# Patient Record
Sex: MALE | Race: WHITE | NOT HISPANIC OR LATINO | Employment: OTHER | URBAN - METROPOLITAN AREA
[De-identification: names, ages, dates, MRNs, and addresses within clinical notes are randomized per-mention and may not be internally consistent; named-entity substitution may affect disease eponyms.]

---

## 2017-03-07 ENCOUNTER — OFFICE VISIT (OUTPATIENT)
Dept: OPTOMETRY | Facility: CLINIC | Age: 72
End: 2017-03-07
Payer: MEDICARE

## 2017-03-07 DIAGNOSIS — H25.13 NUCLEAR SCLEROSIS, BILATERAL: Primary | ICD-10-CM

## 2017-03-07 DIAGNOSIS — H52.4 HYPEROPIA WITH PRESBYOPIA OF BOTH EYES: ICD-10-CM

## 2017-03-07 DIAGNOSIS — H52.03 HYPEROPIA WITH PRESBYOPIA OF BOTH EYES: ICD-10-CM

## 2017-03-07 PROCEDURE — 92015 DETERMINE REFRACTIVE STATE: CPT | Mod: S$GLB,,, | Performed by: OPTOMETRIST

## 2017-03-07 PROCEDURE — 99999 PR PBB SHADOW E&M-EST. PATIENT-LVL II: CPT | Mod: PBBFAC,,, | Performed by: OPTOMETRIST

## 2017-03-07 PROCEDURE — 92004 COMPRE OPH EXAM NEW PT 1/>: CPT | Mod: S$GLB,,, | Performed by: OPTOMETRIST

## 2017-03-08 NOTE — PROGRESS NOTES
HPI     Pt states:increased blurred vision, lost glasses, needs new rx. C/o glare   when driving at night. C/o bump LLL, sore to the touch  PATIENT DENIES FLASHES, FLOATERS, PAIN OR DIPLOPIA   PATIENT'S LAST DFE WAS APPROXIMATELY 6 years ago         Last edited by Farrah Chen, PCT on 3/7/2017  1:50 PM.     ROS     Negative for: Constitutional, Gastrointestinal, Neurological, Skin,   Genitourinary, Musculoskeletal, HENT, Endocrine, Cardiovascular, Eyes,   Respiratory, Psychiatric, Allergic/Imm, Heme/Lymph    Last edited by Paula Christie, OD on 3/7/2017  2:40 PM. (History)        Assessment /Plan     For exam results, see Encounter Report.    Nuclear sclerosis, bilateral    Hyperopia with presbyopia of both eyes            1.  Educated on cataracts and affects on vision.  Early-monitor.  2.  Bifocal rx given.  Eye health normal OU.        RTC 1 year for routine exam.

## 2017-06-27 ENCOUNTER — OFFICE VISIT (OUTPATIENT)
Dept: INTERNAL MEDICINE | Facility: CLINIC | Age: 72
End: 2017-06-27
Attending: FAMILY MEDICINE
Payer: MEDICARE

## 2017-06-27 ENCOUNTER — OFFICE VISIT (OUTPATIENT)
Dept: INTERNAL MEDICINE | Facility: CLINIC | Age: 72
End: 2017-06-27
Payer: MEDICARE

## 2017-06-27 VITALS
DIASTOLIC BLOOD PRESSURE: 76 MMHG | WEIGHT: 149.94 LBS | OXYGEN SATURATION: 92 % | SYSTOLIC BLOOD PRESSURE: 142 MMHG | SYSTOLIC BLOOD PRESSURE: 140 MMHG | WEIGHT: 149.94 LBS | BODY MASS INDEX: 22.79 KG/M2 | OXYGEN SATURATION: 92 % | BODY MASS INDEX: 22.72 KG/M2 | DIASTOLIC BLOOD PRESSURE: 80 MMHG | HEART RATE: 96 BPM | HEIGHT: 68 IN | HEART RATE: 100 BPM | RESPIRATION RATE: 20 BRPM

## 2017-06-27 DIAGNOSIS — R06.01 ORTHOPNEA: ICD-10-CM

## 2017-06-27 DIAGNOSIS — J44.9 CHRONIC OBSTRUCTIVE PULMONARY DISEASE, UNSPECIFIED COPD TYPE: ICD-10-CM

## 2017-06-27 DIAGNOSIS — E78.5 DYSLIPIDEMIA: ICD-10-CM

## 2017-06-27 DIAGNOSIS — F12.90 MARIJUANA SMOKER, EPISODIC: ICD-10-CM

## 2017-06-27 DIAGNOSIS — J44.1 CHRONIC OBSTRUCTIVE PULMONARY DISEASE WITH ACUTE EXACERBATION: Primary | ICD-10-CM

## 2017-06-27 DIAGNOSIS — F10.10 ALCOHOL USE DISORDER, MILD, ABUSE: ICD-10-CM

## 2017-06-27 DIAGNOSIS — F17.200 TOBACCO USE DISORDER: ICD-10-CM

## 2017-06-27 DIAGNOSIS — R03.0 PREHYPERTENSION: ICD-10-CM

## 2017-06-27 DIAGNOSIS — L30.9 DERMATITIS: ICD-10-CM

## 2017-06-27 DIAGNOSIS — Z00.00 ENCOUNTER FOR PREVENTIVE HEALTH EXAMINATION: Primary | ICD-10-CM

## 2017-06-27 DIAGNOSIS — R09.02 HYPOXIA: ICD-10-CM

## 2017-06-27 PROCEDURE — 1126F AMNT PAIN NOTED NONE PRSNT: CPT | Mod: S$GLB,,, | Performed by: FAMILY MEDICINE

## 2017-06-27 PROCEDURE — 1159F MED LIST DOCD IN RCRD: CPT | Mod: S$GLB,,, | Performed by: FAMILY MEDICINE

## 2017-06-27 PROCEDURE — 99999 PR PBB SHADOW E&M-EST. PATIENT-LVL V: CPT | Mod: PBBFAC,,, | Performed by: NURSE PRACTITIONER

## 2017-06-27 PROCEDURE — 99999 PR PBB SHADOW E&M-EST. PATIENT-LVL III: CPT | Mod: PBBFAC,,, | Performed by: FAMILY MEDICINE

## 2017-06-27 PROCEDURE — 99499 UNLISTED E&M SERVICE: CPT | Mod: S$GLB,,, | Performed by: FAMILY MEDICINE

## 2017-06-27 PROCEDURE — 99214 OFFICE O/P EST MOD 30 MIN: CPT | Mod: S$GLB,,, | Performed by: FAMILY MEDICINE

## 2017-06-27 PROCEDURE — G0439 PPPS, SUBSEQ VISIT: HCPCS | Mod: S$GLB,,, | Performed by: NURSE PRACTITIONER

## 2017-06-27 PROCEDURE — 99499 UNLISTED E&M SERVICE: CPT | Mod: S$GLB,,, | Performed by: NURSE PRACTITIONER

## 2017-06-27 RX ORDER — PREDNISONE 20 MG/1
20 TABLET ORAL 2 TIMES DAILY
Qty: 20 TABLET | Refills: 0 | Status: SHIPPED | OUTPATIENT
Start: 2017-06-27 | End: 2017-07-07

## 2017-06-27 RX ORDER — KETOCONAZOLE 20 MG/G
CREAM TOPICAL DAILY
Qty: 60 G | Refills: 0 | Status: SHIPPED | OUTPATIENT
Start: 2017-06-27

## 2017-06-27 RX ORDER — UBIDECARENONE 30 MG
30 CAPSULE ORAL 3 TIMES DAILY
COMMUNITY

## 2017-06-27 RX ORDER — AMOXICILLIN AND CLAVULANATE POTASSIUM 875; 125 MG/1; MG/1
1 TABLET, FILM COATED ORAL 2 TIMES DAILY
Qty: 20 TABLET | Refills: 0 | Status: SHIPPED | OUTPATIENT
Start: 2017-06-27 | End: 2017-07-07

## 2017-06-27 RX ORDER — ALBUTEROL SULFATE 90 UG/1
1-2 AEROSOL, METERED RESPIRATORY (INHALATION) EVERY 6 HOURS PRN
Qty: 18 G | Refills: 1 | Status: SHIPPED | OUTPATIENT
Start: 2017-06-27

## 2017-06-27 NOTE — PATIENT INSTRUCTIONS
Exercise for a Healthier Heart  You may wonder how you can improve the health of your heart. If youre thinking about exercise, youre on the right track. You dont need to become an athlete, but you do need a certain amount of brisk exercise to help strengthen your heart. If you have been diagnosed with a heart condition, your doctor may recommend exercise to help stabilize your condition. To help make exercise a habit, choose safe, fun activities.     Exercise with a friend. When activity is fun, you're more likely to stick with it.     Be sure to check with your healthcare provider before starting an exercise program.   Why exercise?  Exercising regularly offers many healthy rewards. It can help you do all of the following:  · Improve your blood cholesterol level to help prevent further heart trouble  · Lower your blood pressure to help prevent a stroke or heart attack  · Control diabetes, or reduce your risk of getting this disease  · Improve your heart and lung function  · Reach and maintain a healthy weight  · Make your muscles stronger and more limber so you can stay active  · Prevent falls and fractures by slowing the loss of bone mass (osteoporosis)  · Manage stress better  · Reduce your blood pressure  · Improve your sense of self and your body image  Exercise tips  Ease into your routine. Set small goals. Then build on them.  Exercise on most days. Aim for a total of 150 or more minutes of moderate to  vigorous intensity activity each week. Consider 40 minutes, 3 to 4 times a week. For best results, activity should last for 40 minutes on average. It is OK to work up to the 40 minute period over time. Examples of moderate-intensity activity is walking 1 mile in 15 minutes or 30 to 45 minutes of yard work.  Step up your daily activity level. Along with your exercise program, try being more active throughout the day. Walk instead of drive. Do more household tasks or yard work.  Choose one or more  activities you enjoy. Walking is one of the easiest things you can do. You can also try swimming, riding a bike, dancing, or taking an exercise class.  Stop exercising and call your doctor if you:  · Have chest pain or feel dizzy or lightheaded  · Feel burning, tightness, pressure, or heaviness in your chest, neck, shoulders, back, or arms  · Have unusual shortness of breath  · Have increased joint or muscle pain  · Have palpitations or an irregular heartbeat   Date Last Reviewed: 5/1/2016 © 2000-2016 [employer]. [address] [place] [id number]. All rights reserved. This information is not intended as a substitute for professional medical care. Always follow your healthcare professional's instructions.          Counseling and Referral of Other Preventative  (Italic type indicates deductible and co-insurance are waived)    Patient Name: Louis Nickerson  Today's Date: 6/27/2017      SERVICE LIMITATIONS RECOMMENDATION    Vaccines    · Pneumococcal (once after 65)    · Influenza (annually)    · Hepatitis B (if medium/high risk)    · Prevnar 13      Hepatitis B medium/high risk factors:       - End-stage renal disease       - Hemophiliacs who received Factor VII or         IX concentrates       - Clients of institutions for the mentally             retarded       - Persons who live in the same house as          a HepB carrier       - Homosexual men       - Illicit injectable drug abusers     Pneumococcal: Done, no repeat necessary     Influenza: N/A     Hepatitis B: N/A     Prevnar 13: Done, no repeat necessary    Prostate cancer screening (annually to age 75)     Prostate specific antigen (PSA) Shared decision making with Provider. Sometimes a co-pay may be required if the patient decides to have this test. The USPSTF no longer recommends prostate cancer screening routinely in medicine: not to follow    Colorectal cancer screening (to age 75)    · Fecal occult blood test (annual)  · Flexible  sigmoidoscopy (5y)  · Screening colonoscopy (10y)  · Barium enema   Last done 6/2014, recommend to repeat every 4  years    Diabetes self-management training (no USPSTF recommendations)  Requires referral by treating physician for patient with diabetes or renal disease. 10 hours of initial DSMT sessions of no less than 30 minutes each in a continuous 12-month period. 2 hours of follow-up DSMT in subsequent years.  N/A    Glaucoma screening (no USPSTF recommendation)  Diabetes mellitus, family history   , age 50 or over    American, age 65 or over  Done this year, repeat every year    Medical nutrition therapy for diabetes or renal disease (no recommended schedule)  Requires referral by treating physician for patient with diabetes or renal disease or kidney transplant within the past 3 years.  Can be provided in same year as diabetes self-management training (DSMT), and CMS recommends medical nutrition therapy take place after DSMT. Up to 3 hours for initial year and 2 hours in subsequent years.  N/A    Cardiovascular screening blood tests (every 5 years)  · Fasting lipid panel  Order as a panel if possible  Last done 6/2014, recommend to repeat every 5  years    Diabetes screening tests (at least every 3 years, Medicare covers annually or at 6-month intervals for prediabetic patients)  · Fasting blood sugar (FBS) or glucose tolerance test (GTT)  Patient must be diagnosed with one of the following:       - Hypertension       - Dyslipidemia       - Obesity (BMI 30kg/m2)       - Previous elevated impaired FBS or GTT       ... or any two of the following:       - Overweight (BMI 25 but <30)       - Family history of diabetes       - Age 65 or older       - History of gestational diabetes or birth of baby weighing more than 9 pounds  Last done 6/2015, recommend to repeat every 3  years    Abdominal aortic aneurysm screening (once)  · Sonogram   Limited to patients who meet one of the following  criteria:       - Men who are 65-75 years old and have smoked more than 100 cigarette in their lifetime       - Anyone with a family history of abdominal aortic aneurysm       - Anyone recommended for screening by the USPSTF  N/A  Completed    HIV screening (annually for increased risk patients)  · HIV-1 and HIV-2 by EIA, or KAREEM, rapid antibody test or oral mucosa transudate  Patients must be at increased risk for HIV infection per USPSTF guidelines or pregnant. Tests covered annually for patient at increased risk or as requested by the patient. Pregnant patients may receive up to 3 tests during pregnancy.  Risks discussed, screening is not recommended    Smoking cessation counseling (up to 8 sessions per year)  Patients must be asymptomatic of tobacco-related conditions to receive as a preventative service.  Referred to Tobacco Cessation Program.    Subsequent annual wellness visit  At least 12 months since last AWV  Return in one year     The following information is provided to all patients.  This information is to help you find resources for any of the problems found today that may be affecting your health:                Living healthy guide: www.[email or web address]      Understanding Diabetes: www.diabetes.org      Eating healthy: www.cdc.gov/healthyweight      CDC home safety checklist: www.cdc.gov/steadi/patient.html      Agency on Aging: www.[email or web address].[email or web address]      Alcoholics anonymous (AA): www.aa.org      Physical Activity: www.cheri.nih.gov/oy4hgut      Tobacco use: www.quitwithusla.org

## 2017-06-27 NOTE — PROGRESS NOTES
"CHIEF COMPLAINT: COPD exacerbation    HISTORY OF PRESENT ILLNESS: The patient is a very pleasant 71-year-old white male.  He is still an active smoker.  He has a several week history of increasing shortness of breath and wheezing.  His pulse ox is 92 today.  At his last well visit his pulse ox was 97%.  He is having one pillow orthopnea at this time.  He denies symptoms of congestive heart failure.  He is normally cared for by one of my partners    REVIEW OF SYSTEMS:  GENERAL: No fever, chills, fatigability or weight loss.  SKIN: No rashes, itching or changes in color or texture of skin.  HEAD: No headaches or recent head trauma.  EYES: Visual acuity fine. No photophobia, ocular pain or diplopia.  EARS: Denies ear pain, discharge or vertigo.  NOSE: No loss of smell, no epistaxis or postnasal drip.  MOUTH & THROAT: No hoarseness or change in voice. No excessive gum bleeding.  NODES: Denies swollen glands.  CHEST: Denies cyanosis and sputum production.  CARDIOVASCULAR: Denies chest pain, PND.  ABDOMEN: Appetite fine. No weight loss. Denies diarrhea, abdominal pain, hematemesis or blood in stool.  URINARY: No flank pain, dysuria or hematuria.  PERIPHERAL VASCULAR: No claudication or cyanosis.  MUSCULOSKELETAL: No joint stiffness or swelling. Denies back pain.  NEUROLOGIC: No history of seizures, paralysis, alteration of gait or coordination.    SOCIAL HISTORY: The patient does not smoke.  The patient consumes alcohol socially.      PHYSICAL EXAMINATION:   Blood pressure (!) 140/80, pulse 96, height 5' 8" (1.727 m), weight 68 kg (149 lb 14.6 oz), SpO2 (!) 92 %.      APPEARANCE: Well nourished, well developed, in no acute distress.    HEAD: Normocephalic, atraumatic.  EYES: PERRL. EOMI.  Conjunctivae without injection and  anicteric  EARS: TM's intact. Light reflex normal. No retraction or perforation.    NOSE: Mucosa pink. Airway clear.  MOUTH & THROAT: No tonsillar enlargement. No pharyngeal erythema or exudate. No " stridor.  NECK: Supple.   NODES: No cervical, axillary or inguinal lymph node enlargement.  CHEST: Lungs clear to auscultation.  No retractions are noted.  No rales or rhonchi are present.  CARDIOVASCULAR: Normal S1, S2. No rubs, murmurs or gallops.  ABDOMEN: Bowel sounds normal. Not distended. Soft. No tenderness or masses.  No ascites is noted.  MUSCULOSKELETAL:  There is no clubbing, cyanosis, or edema of the extremities x4.  There is full range of motion of the lumbar spine.  There is full range of motion of the extremities x4.  There is no deformity noted.    NEUROLOGIC:       Normal speech development.      Hearing normal.      Normal gait.      Motor and sensory exams grossly normal.      DTR's normal.  PSYCHIATRIC: Patient is alert and oriented x3.  Thought processes are all normal.  There is no homicidality.  There is no suicidality.  There is no evidence of psychosis.    LABORATORY/RADIOLOGY:   Chart reviewed.      ASSESSMENT:   Acute COPD exacerbation  Orthopnea  Cough  Hypoxia    PLAN:  Prednisone and Augmentin  Increase albuterol use   follow-up with PCP if not better in several days

## 2017-06-27 NOTE — MEDICAL/APP STUDENT
"Subjective:       Patient ID: Louis Nickerson is a 71 y.o. male.    Chief Complaint: No chief complaint on file.    HPI Mr. Nickerson presents today for increased SOB and productive cough with increased mucus production. He has a history of COPD and says it has become worse recently. His oxygen saturation is 92% and last year it was 97%. He uses Advair and albuterol and usually only uses one puff at night, but he has needed to use them more often recently and this has still not been enough to help his SOB. He denies fevers. He is a current 1.5 ppd smoker. He is trying to quit and is seeing the smoking cessation providers here.    Review of Systems   Constitutional: Positive for diaphoresis. Negative for chills, fatigue and fever.   HENT: Negative for congestion, postnasal drip, rhinorrhea, sneezing and sore throat.    Eyes: Negative.    Respiratory: Positive for cough, shortness of breath and wheezing. Negative for choking and chest tightness.    Cardiovascular: Negative for chest pain, palpitations and leg swelling.   Gastrointestinal: Negative for abdominal distention and abdominal pain.   Endocrine: Negative.    Genitourinary: Negative for difficulty urinating, dysuria and urgency.   Musculoskeletal: Negative for arthralgias and myalgias.   Skin: Negative.    Allergic/Immunologic: Negative.    Neurological: Negative.  Negative for dizziness, syncope, weakness and headaches.   Hematological: Negative.    Psychiatric/Behavioral: Negative.  Negative for agitation and behavioral problems.       Objective:     BP (!) 140/80   Pulse 96   Ht 5' 8" (1.727 m)   Wt 68 kg (149 lb 14.6 oz)   SpO2 (!) 92%   BMI 22.79 kg/m²     Physical Exam   Constitutional: He is oriented to person, place, and time. He appears well-developed and well-nourished.   HENT:   Head: Normocephalic and atraumatic.   Right Ear: External ear normal.   Left Ear: External ear normal.   Nose: Nose normal.   Mouth/Throat: Oropharynx is clear and " moist.   Eyes: Conjunctivae and EOM are normal. Pupils are equal, round, and reactive to light.   Neck: Normal range of motion. Neck supple.   Cardiovascular: Normal rate, regular rhythm, normal heart sounds and intact distal pulses.    Pulmonary/Chest: Effort normal. He has wheezes.   Crackles bilaterally with decreased breath sounds. Productive cough   Abdominal: Soft. Bowel sounds are normal.   Musculoskeletal: Normal range of motion.   Neurological: He is alert and oriented to person, place, and time. He has normal reflexes.   Skin: Skin is warm and dry.   Psychiatric: He has a normal mood and affect. His behavior is normal.   Vitals reviewed.      Assessment:     Chronic obstructive pulmonary disease with acute exacerbation  -     predniSONE (DELTASONE) 20 MG tablet; Take 1 tablet (20 mg total) by mouth 2 (two) times daily.  Dispense: 20 tablet; Refill: 0  -     amoxicillin-clavulanate 875-125mg (AUGMENTIN) 875-125 mg per tablet; Take 1 tablet by mouth 2 (two) times daily.  Dispense: 20 tablet; Refill: 0      No diagnosis found.    Plan:     Chronic obstructive pulmonary disease with acute exacerbation  -     predniSONE (DELTASONE) 20 MG tablet; Take 1 tablet (20 mg total) by mouth 2 (two) times daily.  Dispense: 20 tablet; Refill: 0  -     amoxicillin-clavulanate 875-125mg (AUGMENTIN) 875-125 mg per tablet; Take 1 tablet by mouth 2 (two) times daily.  Dispense: 20 tablet; Refill: 0

## 2017-06-27 NOTE — PROGRESS NOTES
Louis Nickerson presented for a  Medicare AWV and comprehensive Health Risk Assessment today. The following components were reviewed and updated:    · Medical history  · Family History  · Social history  · Allergies and Current Medications  · Health Risk Assessment  · Health Maintenance  · Care Team     ** See Completed Assessments for Annual Wellness Visit within the encounter summary.**       The following assessments were completed:  · Living Situation  · CAGE  · Depression Screening  · Timed Get Up and Go  · Whisper Test  · Cognitive Function Screening  ·     · Nutrition Screening  · ADL Screening  · PAQ Screening    Review of Systems   Constitutional: Negative for malaise/fatigue.   Eyes: Negative for blurred vision.   Respiratory: Positive for shortness of breath (COPD).    Cardiovascular: Negative for chest pain and leg swelling.   Gastrointestinal: Negative for abdominal pain, nausea and vomiting.   Neurological: Negative for dizziness, weakness and headaches.     Vitals:    06/27/17 0843   BP: (!) 142/76   BP Location: Left arm   Patient Position: Sitting   BP Method: Manual   Pulse: 100   Resp: 20   SpO2: (!) 92%   Weight: 68 kg (149 lb 14.6 oz)     Body mass index is 22.79 kg/m².  Physical Exam   Constitutional: He is oriented to person, place, and time. He appears well-developed and well-nourished. No distress.   HENT:   Head: Normocephalic.   Right Ear: External ear normal.   Left Ear: External ear normal.   Nose: Nose normal.   Eyes: Conjunctivae are normal. No scleral icterus.   Neck: Normal range of motion. Neck supple.   Cardiovascular: Normal rate, regular rhythm, normal heart sounds and intact distal pulses.  Exam reveals no gallop and no friction rub.    No murmur heard.  Pulmonary/Chest: Effort normal. Tachypnea noted. No respiratory distress. He has wheezes. He has no rales. He exhibits no tenderness.   Abdominal: Soft. Bowel sounds are normal.   Musculoskeletal: Normal range of motion. He  exhibits no edema.   Neurological: He is alert and oriented to person, place, and time.   Skin: Skin is warm and dry. He is not diaphoretic. No erythema.   Psychiatric: He has a normal mood and affect. His behavior is normal. Judgment and thought content normal.   Vitals reviewed.        Diagnoses and health risks identified today and associated recommendations/orders:    1. Chronic obstructive pulmonary disease, unspecified COPD type  Chronic.  Worsened in the past couple of weeks. He reports increased shortness of breath and congestion.  He was scheduled for an urgent care visit directly after HRA visit.  Treated with albuterol inhaler PRN and Advair diskus.  Encouraged to continue treatment plan.  Monitored by PCP.    - Ambulatory consult to Pulmonology  - albuterol (VENTOLIN HFA) 90 mcg/actuation inhaler; Inhale 1-2 puffs into the lungs every 6 (six) hours as needed for Wheezing. Rescue  Dispense: 18 g; Refill: 1    2. Marijuana smoker, episodic  Chronic.  Stable.  Reported smoking marijuana daily.  Declined readiness to quit and counseling.  Monitored by PCP.      3. Tobacco use disorder  Chronic.  Smokes 1.5 packs per day, has decreased from 2 packs per day.  Counseled on smoking cessation and encouraged him to speak with Smoking Cessation nurse.  Monitored by PCP.   - Ambulatory referral to Smoking Cessation Program    4. Dermatitis  Chronic.  Stable.  Monitored by Dermatology.    - ketoconazole (NIZORAL) 2 % cream; Apply topically once daily.  Dispense: 60 g; Refill: 0    5. Encounter for preventive health examination  Annual Health Risk Assessment (HRA) visit today.  Counseling and referral of health maintenance and preventative health measures performed.  Patient given annual wellness paperwork to take home.  Encouraged to return in 1 year for subsequent HRA visit.  - Tetanus: Counseled on immunization.       6. Dyslipidemia  Chronic.  Stable.  Monitored by PCP.  He was scheduled for annual exam in July  2017.      7. Prehypertension  Chronic.  Stable.  Elevated BP today: 142/76.  Denies any concerning symptoms.  He states he will follow up with PCP in 2-3 weeks at scheduled visit.  Monitored by PCP.     8. Alcohol use disorder, mild, abuse  Chronic.  Reports drinking 4 to 6 drinks of Scotch on ice per day.  CAGE score = 1 today.  He is able to perform ADL's independently.  PHQ-2 score today = 0.  Denies anxiety.  Monitored by PCP.     Provided Louis with a 5-10 year written screening schedule and personal prevention plan. Recommendations were developed using the USPSTF age appropriate recommendations. Education, counseling, and referrals were provided as needed. After Visit Summary printed and given to patient which includes a list of additional screenings\tests needed.    Return in about 1 year (around 6/27/2018) for your next Health Risk Assessment.    Patricia Oliveira NP

## 2017-07-18 ENCOUNTER — OFFICE VISIT (OUTPATIENT)
Dept: INTERNAL MEDICINE | Facility: CLINIC | Age: 72
End: 2017-07-18
Payer: MEDICARE

## 2017-07-18 VITALS
WEIGHT: 153.69 LBS | HEIGHT: 68 IN | SYSTOLIC BLOOD PRESSURE: 134 MMHG | HEART RATE: 102 BPM | DIASTOLIC BLOOD PRESSURE: 80 MMHG | BODY MASS INDEX: 23.29 KG/M2 | OXYGEN SATURATION: 92 %

## 2017-07-18 DIAGNOSIS — R03.0 PREHYPERTENSION: ICD-10-CM

## 2017-07-18 DIAGNOSIS — F17.200 TOBACCO USE DISORDER: ICD-10-CM

## 2017-07-18 DIAGNOSIS — E78.5 DYSLIPIDEMIA: ICD-10-CM

## 2017-07-18 DIAGNOSIS — J43.8 OTHER EMPHYSEMA: Primary | ICD-10-CM

## 2017-07-18 PROCEDURE — 1159F MED LIST DOCD IN RCRD: CPT | Mod: S$GLB,,, | Performed by: INTERNAL MEDICINE

## 2017-07-18 PROCEDURE — 1126F AMNT PAIN NOTED NONE PRSNT: CPT | Mod: S$GLB,,, | Performed by: INTERNAL MEDICINE

## 2017-07-18 PROCEDURE — 99999 PR PBB SHADOW E&M-EST. PATIENT-LVL III: CPT | Mod: PBBFAC,,, | Performed by: INTERNAL MEDICINE

## 2017-07-18 PROCEDURE — 99499 UNLISTED E&M SERVICE: CPT | Mod: S$GLB,,, | Performed by: INTERNAL MEDICINE

## 2017-07-18 PROCEDURE — 99214 OFFICE O/P EST MOD 30 MIN: CPT | Mod: S$GLB,,, | Performed by: INTERNAL MEDICINE

## 2017-07-18 NOTE — PROGRESS NOTES
"Subjective:       Patient ID: Louis Nickerson is a 71 y.o. male.    Chief Complaint: No chief complaint on file.      Pt here for f/u after seeing my colleague a couple of weeks ago for a COPD exacerbation.  He feels he is just about back to his baseline.      At baseline he gets occasional SOB.  No wheezing.  He is taking albuterol QHS instead of PRN.      He is still smoking.           Review of Systems   Constitutional: Negative.    HENT: Negative.    Eyes: Negative.    Respiratory: Negative.        Objective:       Vitals:    07/18/17 1032   BP: 134/80   BP Location: Left arm   Patient Position: Sitting   BP Method: Automatic   Pulse: 102   SpO2: (!) 92%   Weight: 69.7 kg (153 lb 10.6 oz)   Height: 5' 8" (1.727 m)     Physical Exam   Constitutional: He is oriented to person, place, and time. He appears well-developed and well-nourished. No distress.   HENT:   Head: Normocephalic and atraumatic.   Right Ear: Tympanic membrane, external ear and ear canal normal.   Left Ear: Tympanic membrane, external ear and ear canal normal.   Mouth/Throat: Oropharynx is clear and moist and mucous membranes are normal. No oropharyngeal exudate or posterior oropharyngeal erythema.   Eyes: Conjunctivae and EOM are normal. Pupils are equal, round, and reactive to light.   Neck: Normal range of motion. Neck supple. No thyromegaly present.   Cardiovascular: Normal rate, regular rhythm and normal heart sounds.  Exam reveals no gallop and no friction rub.    No murmur heard.  Pulmonary/Chest: Effort normal and breath sounds normal. No respiratory distress. He has no wheezes. He has no rhonchi. He has no rales.   Lymphadenopathy:     He has no cervical adenopathy.   Neurological: He is alert and oriented to person, place, and time.   Skin: He is not diaphoretic.       Assessment:       1. Other emphysema    2. Tobacco use disorder    3. Prehypertension    4. Dyslipidemia        Plan:           COPD - Exacerbation has resolved.  Pt is " back to his baseline.  It seems he is restricting his activity some in order to avoid sx of COPD, and that he could benefit from a higher strength of Advair.  However, he declines.  Cont current tx.  Stop smoking.     Tobacco - Pt advised to quit.  Pt aware of risks.  Pt not ready to quit.      Elevated BP - Pt advised regarding lifestyle modifications.  Cont to monitor.      HLD - Cont statin.  Pt declined to have an updated lipid panel.

## 2018-03-18 RX ORDER — CEPHALEXIN 250 MG/1
CAPSULE ORAL
Qty: 180 EACH | Refills: 0 | Status: SHIPPED | OUTPATIENT
Start: 2018-03-18 | End: 2018-05-04

## 2018-04-06 ENCOUNTER — PES CALL (OUTPATIENT)
Dept: ADMINISTRATIVE | Facility: CLINIC | Age: 73
End: 2018-04-06

## 2018-05-04 ENCOUNTER — OFFICE VISIT (OUTPATIENT)
Dept: INTERNAL MEDICINE | Facility: CLINIC | Age: 73
End: 2018-05-04
Attending: INTERNAL MEDICINE
Payer: MEDICARE

## 2018-05-04 ENCOUNTER — LAB VISIT (OUTPATIENT)
Dept: LAB | Facility: OTHER | Age: 73
End: 2018-05-04
Attending: INTERNAL MEDICINE
Payer: MEDICARE

## 2018-05-04 VITALS
BODY MASS INDEX: 23.82 KG/M2 | SYSTOLIC BLOOD PRESSURE: 126 MMHG | HEIGHT: 68 IN | HEART RATE: 104 BPM | WEIGHT: 157.19 LBS | DIASTOLIC BLOOD PRESSURE: 78 MMHG

## 2018-05-04 DIAGNOSIS — R21 SKIN RASH: ICD-10-CM

## 2018-05-04 DIAGNOSIS — R79.9 ABNORMAL FINDING OF BLOOD CHEMISTRY: ICD-10-CM

## 2018-05-04 DIAGNOSIS — E78.5 HYPERLIPIDEMIA, UNSPECIFIED HYPERLIPIDEMIA TYPE: ICD-10-CM

## 2018-05-04 DIAGNOSIS — E78.5 HYPERLIPIDEMIA, UNSPECIFIED HYPERLIPIDEMIA TYPE: Primary | ICD-10-CM

## 2018-05-04 DIAGNOSIS — J44.1 CHRONIC OBSTRUCTIVE PULMONARY DISEASE WITH ACUTE EXACERBATION: ICD-10-CM

## 2018-05-04 LAB
ALBUMIN SERPL BCP-MCNC: 3.6 G/DL
ALP SERPL-CCNC: 72 U/L
ALT SERPL W/O P-5'-P-CCNC: 15 U/L
ANION GAP SERPL CALC-SCNC: 10 MMOL/L
AST SERPL-CCNC: 17 U/L
BASOPHILS # BLD AUTO: 0.02 K/UL
BASOPHILS NFR BLD: 0.4 %
BILIRUB SERPL-MCNC: 0.5 MG/DL
BUN SERPL-MCNC: 11 MG/DL
CALCIUM SERPL-MCNC: 9.4 MG/DL
CHLORIDE SERPL-SCNC: 103 MMOL/L
CHOLEST SERPL-MCNC: 171 MG/DL
CHOLEST/HDLC SERPL: 2.2 {RATIO}
CO2 SERPL-SCNC: 25 MMOL/L
CREAT SERPL-MCNC: 0.8 MG/DL
DIFFERENTIAL METHOD: ABNORMAL
EOSINOPHIL # BLD AUTO: 0.3 K/UL
EOSINOPHIL NFR BLD: 5.7 %
ERYTHROCYTE [DISTWIDTH] IN BLOOD BY AUTOMATED COUNT: 12.9 %
EST. GFR  (AFRICAN AMERICAN): >60 ML/MIN/1.73 M^2
EST. GFR  (NON AFRICAN AMERICAN): >60 ML/MIN/1.73 M^2
ESTIMATED AVG GLUCOSE: 105 MG/DL
GLUCOSE SERPL-MCNC: 81 MG/DL
HBA1C MFR BLD HPLC: 5.3 %
HCT VFR BLD AUTO: 45.1 %
HDLC SERPL-MCNC: 78 MG/DL
HDLC SERPL: 45.6 %
HGB BLD-MCNC: 15 G/DL
LDLC SERPL CALC-MCNC: 80.6 MG/DL
LYMPHOCYTES # BLD AUTO: 1.5 K/UL
LYMPHOCYTES NFR BLD: 32.2 %
MCH RBC QN AUTO: 31.8 PG
MCHC RBC AUTO-ENTMCNC: 33.3 G/DL
MCV RBC AUTO: 96 FL
MONOCYTES # BLD AUTO: 0.4 K/UL
MONOCYTES NFR BLD: 9.6 %
NEUTROPHILS # BLD AUTO: 2.4 K/UL
NEUTROPHILS NFR BLD: 51.9 %
NONHDLC SERPL-MCNC: 93 MG/DL
PLATELET # BLD AUTO: 217 K/UL
PMV BLD AUTO: 9.9 FL
POTASSIUM SERPL-SCNC: 4 MMOL/L
PROT SERPL-MCNC: 6.3 G/DL
RBC # BLD AUTO: 4.71 M/UL
SODIUM SERPL-SCNC: 138 MMOL/L
TRIGL SERPL-MCNC: 62 MG/DL
TSH SERPL DL<=0.005 MIU/L-ACNC: 1.07 UIU/ML
WBC # BLD AUTO: 4.59 K/UL

## 2018-05-04 PROCEDURE — 84443 ASSAY THYROID STIM HORMONE: CPT

## 2018-05-04 PROCEDURE — 83036 HEMOGLOBIN GLYCOSYLATED A1C: CPT

## 2018-05-04 PROCEDURE — 99999 PR PBB SHADOW E&M-EST. PATIENT-LVL III: CPT | Mod: PBBFAC,,, | Performed by: INTERNAL MEDICINE

## 2018-05-04 PROCEDURE — 85025 COMPLETE CBC W/AUTO DIFF WBC: CPT

## 2018-05-04 PROCEDURE — 80061 LIPID PANEL: CPT

## 2018-05-04 PROCEDURE — 80053 COMPREHEN METABOLIC PANEL: CPT

## 2018-05-04 PROCEDURE — 99214 OFFICE O/P EST MOD 30 MIN: CPT | Mod: S$GLB,,, | Performed by: INTERNAL MEDICINE

## 2018-05-04 PROCEDURE — 36415 COLL VENOUS BLD VENIPUNCTURE: CPT

## 2018-05-04 RX ORDER — FLUTICASONE PROPIONATE AND SALMETEROL 250; 50 UG/1; UG/1
1 POWDER RESPIRATORY (INHALATION) 2 TIMES DAILY
Qty: 180 EACH | Refills: 2 | Status: SHIPPED | OUTPATIENT
Start: 2018-05-04 | End: 2019-01-01 | Stop reason: SDUPTHER

## 2018-05-04 NOTE — PROGRESS NOTES
Subjective:       Patient ID: Louis Nickerson is a 72 y.o. male.    Chief Complaint: Annual Exam    Here to establish care    Would like to see a dermatologist. Hx of skin CA on nose     Uses advair and albuterol nightly. Does not take morning dose of advair. Daily chronic cough. Denies daytime symptoms of chest tightness, SOB. He continues to smoke. No acute issues.                        Review of Systems   Constitutional: Negative for appetite change, chills, fever and unexpected weight change.   HENT: Negative for hearing loss, sore throat and trouble swallowing.    Eyes: Negative for visual disturbance.   Respiratory: Negative for cough, chest tightness and shortness of breath.    Cardiovascular: Negative for chest pain and leg swelling.   Gastrointestinal: Negative for abdominal pain, blood in stool, constipation, diarrhea, nausea and vomiting.   Endocrine: Negative for polydipsia and polyuria.   Genitourinary: Negative for decreased urine volume, difficulty urinating, dysuria, frequency and urgency.   Musculoskeletal: Negative for gait problem.   Skin: Negative for rash.   Neurological: Negative for dizziness and numbness.   Psychiatric/Behavioral: The patient is not nervous/anxious.        Past Medical History:   Diagnosis Date    Cancer 1987    skin CA on nose    Colon polyps 1/20/2015    Noted on colonoscopy report from 1/19/2015 from [facility].  Polyps removed.      COPD (chronic obstructive pulmonary disease)     Depression     Diverticulosis 1/20/2015    Rib fracture     T8 - noted on chest xray     Past Surgical History:   Procedure Laterality Date    DENTAL SURGERY  2009    NOSE SURGERY  1996    cancer on tip of nose was removed    TONSILLECTOMY       Family History   Problem Relation Age of Onset    Hypertension Mother     Cancer Father     Hypertension Father     Diabetes Maternal Aunt     Cancer Paternal Grandmother     Cancer Paternal  "Grandfather     Alzheimer's disease Maternal Grandfather     No Known Problems Sister     No Known Problems Sister     No Known Problems Sister      Social History     Social History    Marital status:      Spouse name: N/A    Number of children: N/A    Years of education: N/A     Occupational History    Retired.  Does frequent volunteer work.      Social History Main Topics    Smoking status: Current Every Day Smoker     Packs/day: 1.50     Years: 56.00     Types: Cigarettes     Start date: 1960    Smokeless tobacco: Never Used    Alcohol use Yes      Comment: 4-6 Scotch on ice per day    Drug use: Yes     Types: Marijuana      Comment: 1 daily    Sexual activity: No     Other Topics Concern    Not on file     Social History Narrative    Lives w/wife.           Objective:      Vitals:    05/04/18 1436   BP: 126/78   Pulse: 104   Weight: 71.3 kg (157 lb 3 oz)   Height: 5' 8" (1.727 m)      Physical Exam   Constitutional: He is oriented to person, place, and time. He appears well-developed and well-nourished. No distress.   HENT:   Head: Normocephalic and atraumatic.   Mouth/Throat: Oropharynx is clear and moist. No oropharyngeal exudate.   Eyes: Conjunctivae and EOM are normal. Pupils are equal, round, and reactive to light. No scleral icterus.   Neck: No thyromegaly present.   Cardiovascular: Normal rate, regular rhythm and normal heart sounds.    No murmur heard.  Pulmonary/Chest: Effort normal and breath sounds normal. He has no wheezes. He has no rales.   Abdominal: Soft. He exhibits no distension. There is no tenderness.   Musculoskeletal: He exhibits no edema or tenderness.   Lymphadenopathy:     He has no cervical adenopathy.   Neurological: He is alert and oriented to person, place, and time.   Skin: Skin is warm and dry.   Psychiatric: He has a normal mood and affect. His behavior is normal.       Assessment:       1. Hyperlipidemia, unspecified hyperlipidemia type    2. Skin rash    3. " Abnormal finding of blood chemistry     4. Chronic obstructive pulmonary disease with acute exacerbation        Plan:       Louis was seen today for annual exam.    Diagnoses and all orders for this visit:    Hyperlipidemia, unspecified hyperlipidemia type  -     Comprehensive metabolic panel; Future  -     Lipid panel; Future  -     TSH; Future  -     CBC auto differential; Future  -     Hemoglobin A1c; Future    Skin rash  -     Ambulatory referral to Dermatology    Abnormal finding of blood chemistry   -     Hemoglobin A1c; Future    Chronic obstructive pulmonary disease with acute exacerbation  -    INCREASE fluticasone-salmeterol 250-50 mcg/dose (ADVAIR) 250-50 mcg/dose diskus inhaler; Inhale 1 puff into the lungs 2 (two) times daily. Controller    RTC in 6 months or sooner prn                   Notification of Lab Results: [facility]  Side effects of medication(s) were discussed in detail and patient voiced understanding.  Patient will call back for any issues or complications.

## 2018-06-07 ENCOUNTER — PES CALL (OUTPATIENT)
Dept: ADMINISTRATIVE | Facility: CLINIC | Age: 73
End: 2018-06-07

## 2018-06-25 ENCOUNTER — OFFICE VISIT (OUTPATIENT)
Dept: INTERNAL MEDICINE | Facility: CLINIC | Age: 73
End: 2018-06-25
Payer: MEDICARE

## 2018-06-25 VITALS
OXYGEN SATURATION: 93 % | SYSTOLIC BLOOD PRESSURE: 134 MMHG | BODY MASS INDEX: 23.72 KG/M2 | DIASTOLIC BLOOD PRESSURE: 60 MMHG | HEART RATE: 109 BPM | HEIGHT: 68 IN | WEIGHT: 156.5 LBS

## 2018-06-25 DIAGNOSIS — E78.5 HYPERLIPIDEMIA, UNSPECIFIED HYPERLIPIDEMIA TYPE: ICD-10-CM

## 2018-06-25 DIAGNOSIS — F10.10 ALCOHOL USE DISORDER, MILD, ABUSE: ICD-10-CM

## 2018-06-25 DIAGNOSIS — Z00.00 ENCOUNTER FOR PREVENTIVE HEALTH EXAMINATION: Primary | ICD-10-CM

## 2018-06-25 DIAGNOSIS — F17.200 TOBACCO USE DISORDER: ICD-10-CM

## 2018-06-25 DIAGNOSIS — F12.90 MARIJUANA SMOKER, EPISODIC: ICD-10-CM

## 2018-06-25 DIAGNOSIS — R03.0 PREHYPERTENSION: ICD-10-CM

## 2018-06-25 DIAGNOSIS — J43.8 OTHER EMPHYSEMA: ICD-10-CM

## 2018-06-25 PROCEDURE — 99999 PR PBB SHADOW E&M-EST. PATIENT-LVL III: CPT | Mod: PBBFAC,,, | Performed by: NURSE PRACTITIONER

## 2018-06-25 PROCEDURE — 99499 UNLISTED E&M SERVICE: CPT | Mod: S$GLB,,, | Performed by: NURSE PRACTITIONER

## 2018-06-25 PROCEDURE — G0439 PPPS, SUBSEQ VISIT: HCPCS | Mod: S$GLB,,, | Performed by: NURSE PRACTITIONER

## 2018-06-25 NOTE — PATIENT INSTRUCTIONS
Counseling and Referral of Other Preventative  (Italic type indicates deductible and co-insurance are waived)    Patient Name: Louis Nickerson  Today's Date: 6/25/2018    Health Maintenance       Date Due Completion Date    Influenza Vaccine 08/01/2018 10/25/2016    Colonoscopy 01/19/2019 1/19/2015 (Done)    Override on 1/19/2015: Done    Lipid Panel 05/04/2023 5/4/2018    TETANUS VACCINE 05/04/2028 5/4/2018        No orders of the defined types were placed in this encounter.    The following information is provided to all patients.  This information is to help you find resources for any of the problems found today that may be affecting your health:                Living healthy guide: www.[facility].[place].gov      Understanding Diabetes: www.diabetes.org      Eating healthy: www.cdc.gov/healthyweight      CDC home safety checklist: www.cdc.gov/steadi/patient.html      Agency on Aging: www.goea.[place].[facility]      Alcoholics anonymous (AA): www.aa.org      Physical Activity: www.cheri.nih.gov/uk7zfie      Tobacco use: www.quitwithusla.org

## 2018-06-25 NOTE — PROGRESS NOTES
"Louis Nickerson presented for a  Medicare AWV and comprehensive Health Risk Assessment today. The following components were reviewed and updated:    · Medical history  · Family History  · Social history  · Allergies and Current Medications  · Health Risk Assessment  · Health Maintenance  · Care Team     ** See Completed Assessments for Annual Wellness Visit within the encounter summary.**       The following assessments were completed:  · Living Situation  · CAGE  · Depression Screening  · Timed Get Up and Go  · Whisper Test  · Cognitive Function Screening  · Nutrition Screening  · ADL Screening  · PAQ Screening          Vitals:    06/25/18 1228   BP: 134/60   BP Location: Left arm   Patient Position: Sitting   Pulse: 109   SpO2: (!) 93%   Weight: 71 kg (156 lb 8.4 oz)   Height: 5' 8" (1.727 m)     Body mass index is 23.8 kg/m².     Physical Exam   Constitutional: He is oriented to person, place, and time. He appears well-developed and well-nourished.   HENT:   Head: Normocephalic and atraumatic. Not macrocephalic and not microcephalic. Head is without raccoon's eyes, without Roldan's sign, without abrasion, without contusion, without laceration, without right periorbital erythema and without left periorbital erythema. Hair is normal.   Right Ear: No lacerations. No drainage, swelling or tenderness. No foreign bodies. No mastoid tenderness. Tympanic membrane is not injected, not scarred, not perforated, not erythematous, not retracted and not bulging. Tympanic membrane mobility is normal. No middle ear effusion. No hemotympanum. No decreased hearing is noted.   Left Ear: No lacerations. No drainage, swelling or tenderness. No foreign bodies. No mastoid tenderness. Tympanic membrane is not injected, not scarred, not perforated, not erythematous, not retracted and not bulging. Tympanic membrane mobility is normal.  No middle ear effusion. No hemotympanum. No decreased hearing is noted.   Nose: Nose normal. No mucosal " edema, rhinorrhea, nose lacerations, sinus tenderness or nasal deformity.   Mouth/Throat: Uvula is midline.   Eyes: Conjunctivae and lids are normal. No scleral icterus.   Neck: Trachea normal. Neck supple. No spinous process tenderness and no muscular tenderness present. No neck rigidity. No edema, no erythema and normal range of motion present. No thyroid mass and no thyromegaly present.   Cardiovascular: Normal rate, regular rhythm, normal heart sounds and intact distal pulses.  Exam reveals no gallop and no friction rub.    No murmur heard.  Pulmonary/Chest: Effort normal and breath sounds normal. No respiratory distress. He has no wheezes. He has no rales. He exhibits no tenderness.   Abdominal: Soft. Bowel sounds are normal.   Musculoskeletal: Normal range of motion.   Lymphadenopathy:        Head (right side): No submental, no submandibular, no tonsillar, no preauricular and no posterior auricular adenopathy present.        Head (left side): No submental, no submandibular, no tonsillar, no preauricular, no posterior auricular and no occipital adenopathy present.   Neurological: He is alert and oriented to person, place, and time.   Skin: Skin is warm and dry.   Psychiatric: He has a normal mood and affect. His behavior is normal. Judgment and thought content normal.   Vitals reviewed.      Diagnoses and health risks identified today and associated recommendations/orders:    1. Encounter for preventive health examination  Annual Health Risk Assessment (HRA) visit today.  Counseling and referral of health maintenance and preventative health measures performed.  Patient given annual wellness paperwork to take home.  Encouraged to return in 1 year for subsequent HRA visit.     2. Prehypertension  Chronic. Stable. Encouraged to increase exercise as tolerated (moderate-intensity aerobic activity and muscle-strengthening activities) improve diet to heart healthy, low sodium diet. Continue current treatment plan.  Monitored by PCP.    3. Other emphysema  Chronic. Stable. Continue current treatment plan. Monitored by PCP.    4. Hyperlipidemia, unspecified hyperlipidemia type  Chronic. Stable. Monitored by PCP.    5. Alcohol use disorder, mild, abuse  Chronic. Stable. Monitored by PCP.    6. Tobacco use disorder  Chronic. Stable. Educated on harmful effects of cigarette smoking; patient is aware. He declines smoking cessation. Monitored by PCP.    7. Marijuana smoker, episodic  Chronic. Stable. Monitored by PCP.        Provided Louis with a 5-10 year written screening schedule and personal prevention plan. Recommendations were developed using the USPSTF age appropriate recommendations. Education, counseling, and referrals were provided as needed. After Visit Summary printed and given to patient which includes a list of additional screenings\tests needed.    No Follow-up on file.    Manuel Rendon NP

## 2019-01-01 ENCOUNTER — HOSPITAL ENCOUNTER (OUTPATIENT)
Dept: RADIOLOGY | Facility: OTHER | Age: 74
Discharge: HOME | End: 2019-07-31
Attending: INTERNAL MEDICINE
Payer: MEDICARE

## 2019-01-01 ENCOUNTER — ANESTHESIA (OUTPATIENT)
Dept: ENDOSCOPY | Facility: HOSPITAL | Age: 74
End: 2019-01-01
Payer: MEDICARE

## 2019-01-01 ENCOUNTER — HOSPITAL ENCOUNTER (OUTPATIENT)
Dept: RADIOLOGY | Facility: OTHER | Age: 74
Discharge: HOME | End: 2019-07-24
Attending: INTERNAL MEDICINE
Payer: MEDICARE

## 2019-01-01 ENCOUNTER — OFFICE VISIT (OUTPATIENT)
Dept: INTERNAL MEDICINE | Facility: CLINIC | Age: 74
End: 2019-01-01
Payer: MEDICARE

## 2019-01-01 ENCOUNTER — TREATMENT PLANNING (OUTPATIENT)
Dept: GASTROENTEROLOGY | Facility: CLINIC | Age: 74
End: 2019-01-01

## 2019-01-01 ENCOUNTER — TELEPHONE (OUTPATIENT)
Dept: INTERNAL MEDICINE | Facility: CLINIC | Age: 74
End: 2019-01-01

## 2019-01-01 ENCOUNTER — LAB VISIT (OUTPATIENT)
Dept: LAB | Facility: OTHER | Age: 74
End: 2019-01-01
Attending: INTERNAL MEDICINE
Payer: MEDICARE

## 2019-01-01 ENCOUNTER — TELEPHONE (OUTPATIENT)
Dept: ENDOSCOPY | Facility: HOSPITAL | Age: 74
End: 2019-01-01

## 2019-01-01 ENCOUNTER — HOSPITAL ENCOUNTER (OUTPATIENT)
Facility: HOSPITAL | Age: 74
Discharge: HOME | End: 2019-07-30
Attending: INTERNAL MEDICINE | Admitting: INTERNAL MEDICINE
Payer: MEDICARE

## 2019-01-01 ENCOUNTER — OFFICE VISIT (OUTPATIENT)
Dept: DERMATOLOGY | Facility: CLINIC | Age: 74
End: 2019-01-01
Payer: MEDICARE

## 2019-01-01 ENCOUNTER — TELEPHONE (OUTPATIENT)
Dept: DERMATOLOGY | Facility: CLINIC | Age: 74
End: 2019-01-01

## 2019-01-01 ENCOUNTER — HOSPITAL ENCOUNTER (INPATIENT)
Facility: HOSPITAL | Age: 74
LOS: 1 days | DRG: 435 | End: 2019-08-11
Attending: EMERGENCY MEDICINE | Admitting: INTERNAL MEDICINE
Payer: MEDICARE

## 2019-01-01 ENCOUNTER — PROCEDURE VISIT (OUTPATIENT)
Dept: DERMATOLOGY | Facility: CLINIC | Age: 74
End: 2019-01-01
Payer: MEDICARE

## 2019-01-01 ENCOUNTER — PES CALL (OUTPATIENT)
Dept: ADMINISTRATIVE | Facility: CLINIC | Age: 74
End: 2019-01-01

## 2019-01-01 ENCOUNTER — OFFICE VISIT (OUTPATIENT)
Dept: INTERNAL MEDICINE | Facility: CLINIC | Age: 74
End: 2019-01-01
Attending: INTERNAL MEDICINE
Payer: MEDICARE

## 2019-01-01 ENCOUNTER — OFFICE VISIT (OUTPATIENT)
Dept: FAMILY MEDICINE | Facility: CLINIC | Age: 74
End: 2019-01-01
Attending: FAMILY MEDICINE
Payer: MEDICARE

## 2019-01-01 ENCOUNTER — ANESTHESIA EVENT (OUTPATIENT)
Dept: ENDOSCOPY | Facility: HOSPITAL | Age: 74
End: 2019-01-01
Payer: MEDICARE

## 2019-01-01 ENCOUNTER — DOCUMENTATION ONLY (OUTPATIENT)
Dept: INTERNAL MEDICINE | Facility: CLINIC | Age: 74
End: 2019-01-01

## 2019-01-01 VITALS
DIASTOLIC BLOOD PRESSURE: 77 MMHG | WEIGHT: 158 LBS | HEART RATE: 93 BPM | BODY MASS INDEX: 23.95 KG/M2 | SYSTOLIC BLOOD PRESSURE: 127 MMHG | HEIGHT: 68 IN

## 2019-01-01 VITALS
TEMPERATURE: 96 F | SYSTOLIC BLOOD PRESSURE: 72 MMHG | WEIGHT: 150 LBS | HEIGHT: 69 IN | RESPIRATION RATE: 20 BRPM | DIASTOLIC BLOOD PRESSURE: 37 MMHG | BODY MASS INDEX: 22.22 KG/M2 | OXYGEN SATURATION: 90 %

## 2019-01-01 VITALS
BODY MASS INDEX: 25.92 KG/M2 | DIASTOLIC BLOOD PRESSURE: 54 MMHG | HEART RATE: 118 BPM | HEIGHT: 69 IN | WEIGHT: 175 LBS | OXYGEN SATURATION: 95 % | SYSTOLIC BLOOD PRESSURE: 102 MMHG

## 2019-01-01 VITALS
WEIGHT: 175.94 LBS | SYSTOLIC BLOOD PRESSURE: 122 MMHG | HEIGHT: 69 IN | DIASTOLIC BLOOD PRESSURE: 62 MMHG | OXYGEN SATURATION: 95 % | HEART RATE: 117 BPM | BODY MASS INDEX: 26.06 KG/M2

## 2019-01-01 VITALS
SYSTOLIC BLOOD PRESSURE: 99 MMHG | HEART RATE: 113 BPM | TEMPERATURE: 98 F | DIASTOLIC BLOOD PRESSURE: 57 MMHG | OXYGEN SATURATION: 93 % | RESPIRATION RATE: 20 BRPM

## 2019-01-01 VITALS
HEART RATE: 106 BPM | SYSTOLIC BLOOD PRESSURE: 116 MMHG | HEIGHT: 68 IN | DIASTOLIC BLOOD PRESSURE: 70 MMHG | OXYGEN SATURATION: 99 % | BODY MASS INDEX: 24.06 KG/M2 | WEIGHT: 158.75 LBS

## 2019-01-01 DIAGNOSIS — Z86.010 HISTORY OF COLON POLYPS: ICD-10-CM

## 2019-01-01 DIAGNOSIS — Z12.11 SPECIAL SCREENING FOR MALIGNANT NEOPLASMS, COLON: Primary | ICD-10-CM

## 2019-01-01 DIAGNOSIS — R13.10 DYSPHAGIA, UNSPECIFIED TYPE: Primary | ICD-10-CM

## 2019-01-01 DIAGNOSIS — A41.9 SEPSIS: ICD-10-CM

## 2019-01-01 DIAGNOSIS — E78.5 HYPERLIPIDEMIA, UNSPECIFIED HYPERLIPIDEMIA TYPE: ICD-10-CM

## 2019-01-01 DIAGNOSIS — R20.9 DISTURBANCE OF SKIN SENSATION: ICD-10-CM

## 2019-01-01 DIAGNOSIS — R19.06 EPIGASTRIC MASS: ICD-10-CM

## 2019-01-01 DIAGNOSIS — D64.9 ANEMIA, UNSPECIFIED TYPE: ICD-10-CM

## 2019-01-01 DIAGNOSIS — R03.0 PREHYPERTENSION: ICD-10-CM

## 2019-01-01 DIAGNOSIS — J43.8 OTHER EMPHYSEMA: ICD-10-CM

## 2019-01-01 DIAGNOSIS — C44.319 BASAL CELL CARCINOMA OF LEFT CHEEK: Primary | ICD-10-CM

## 2019-01-01 DIAGNOSIS — R53.81 PHYSICAL DECONDITIONING: ICD-10-CM

## 2019-01-01 DIAGNOSIS — Z12.5 ENCOUNTER FOR SCREENING FOR MALIGNANT NEOPLASM OF PROSTATE: ICD-10-CM

## 2019-01-01 DIAGNOSIS — R62.7 FTT (FAILURE TO THRIVE) IN ADULT: ICD-10-CM

## 2019-01-01 DIAGNOSIS — C34.91 MALIGNANT NEOPLASM OF RIGHT LUNG, UNSPECIFIED PART OF LUNG: Primary | ICD-10-CM

## 2019-01-01 DIAGNOSIS — R07.9 CHEST PAIN: ICD-10-CM

## 2019-01-01 DIAGNOSIS — R91.8 LUNG MASS: ICD-10-CM

## 2019-01-01 DIAGNOSIS — R79.9 ABNORMAL FINDING OF BLOOD CHEMISTRY: ICD-10-CM

## 2019-01-01 DIAGNOSIS — Z00.00 ENCOUNTER FOR PREVENTIVE HEALTH EXAMINATION: Primary | ICD-10-CM

## 2019-01-01 DIAGNOSIS — Z03.89 OBSERVATION FOR SUSPECTED CANCER: ICD-10-CM

## 2019-01-01 DIAGNOSIS — R60.1 GENERALIZED EDEMA: Primary | ICD-10-CM
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
## 2019-01-01 DIAGNOSIS — R79.1 ELEVATED INR: ICD-10-CM

## 2019-01-01 DIAGNOSIS — R13.10 DYSPHAGIA: ICD-10-CM

## 2019-01-01 DIAGNOSIS — D64.9 NORMOCYTIC ANEMIA: ICD-10-CM

## 2019-01-01 DIAGNOSIS — K76.9 LIVER LESION: Primary | ICD-10-CM

## 2019-01-01 DIAGNOSIS — D49.1 NEOPLASM OF LUNG: ICD-10-CM

## 2019-01-01 DIAGNOSIS — N17.9 AKI (ACUTE KIDNEY INJURY): ICD-10-CM

## 2019-01-01 DIAGNOSIS — Z00.00 ANNUAL PHYSICAL EXAM: ICD-10-CM

## 2019-01-01 DIAGNOSIS — F17.200 TOBACCO USE DISORDER: ICD-10-CM

## 2019-01-01 DIAGNOSIS — Z09 POSTOP CHECK: Primary | ICD-10-CM

## 2019-01-01 DIAGNOSIS — R79.89 ELEVATED PROCALCITONIN: ICD-10-CM

## 2019-01-01 DIAGNOSIS — E87.20 METABOLIC ACIDOSIS: ICD-10-CM

## 2019-01-01 DIAGNOSIS — F10.10 ALCOHOL USE DISORDER, MILD, ABUSE: ICD-10-CM

## 2019-01-01 DIAGNOSIS — Z00.00 ANNUAL PHYSICAL EXAM: Primary | ICD-10-CM

## 2019-01-01 DIAGNOSIS — A41.9 SEPSIS, DUE TO UNSPECIFIED ORGANISM: ICD-10-CM

## 2019-01-01 DIAGNOSIS — K76.9 LIVER LESION: ICD-10-CM

## 2019-01-01 DIAGNOSIS — F12.90 MARIJUANA SMOKER, EPISODIC: ICD-10-CM

## 2019-01-01 DIAGNOSIS — R79.89 ELEVATED LACTIC ACID LEVEL: ICD-10-CM

## 2019-01-01 DIAGNOSIS — R53.83 FATIGUE, UNSPECIFIED TYPE: ICD-10-CM

## 2019-01-01 DIAGNOSIS — R18.8 OTHER ASCITES: ICD-10-CM

## 2019-01-01 DIAGNOSIS — R23.4 CHANGES IN SKIN TEXTURE: ICD-10-CM

## 2019-01-01 DIAGNOSIS — R13.10 DYSPHAGIA, UNSPECIFIED TYPE: ICD-10-CM

## 2019-01-01 DIAGNOSIS — R19.06 EPIGASTRIC MASS: Primary | ICD-10-CM

## 2019-01-01 DIAGNOSIS — C78.7 LIVER METASTASES: ICD-10-CM

## 2019-01-01 DIAGNOSIS — R21 RASH AND NONSPECIFIC SKIN ERUPTION: Primary | ICD-10-CM

## 2019-01-01 LAB
ABO + RH BLD: NORMAL
ALBUMIN SERPL BCP-MCNC: 2 G/DL (ref 3.5–5.2)
ALBUMIN SERPL BCP-MCNC: 2.3 G/DL (ref 3.5–5.2)
ALBUMIN SERPL BCP-MCNC: 3.3 G/DL (ref 3.5–5.2)
ALLENS TEST: ABNORMAL
ALP SERPL-CCNC: 535 U/L (ref 55–135)
ALP SERPL-CCNC: 570 U/L (ref 55–135)
ALP SERPL-CCNC: 679 U/L (ref 55–135)
ALT SERPL W/O P-5'-P-CCNC: 288 U/L (ref 10–44)
ALT SERPL W/O P-5'-P-CCNC: 290 U/L (ref 10–44)
ALT SERPL W/O P-5'-P-CCNC: 91 U/L (ref 10–44)
AMMONIA PLAS-SCNC: 108 UMOL/L (ref 10–50)
ANION GAP SERPL CALC-SCNC: 12 MMOL/L (ref 8–16)
ANION GAP SERPL CALC-SCNC: 21 MMOL/L (ref 8–16)
ANION GAP SERPL CALC-SCNC: 25 MMOL/L (ref 8–16)
ANISOCYTOSIS BLD QL SMEAR: SLIGHT
AST SERPL-CCNC: 144 U/L (ref 10–40)
AST SERPL-CCNC: 788 U/L (ref 10–40)
AST SERPL-CCNC: 808 U/L (ref 10–40)
BACTERIA #/AREA URNS AUTO: ABNORMAL /HPF
BASOPHILS # BLD AUTO: 0.06 K/UL (ref 0–0.2)
BASOPHILS # BLD AUTO: 0.06 K/UL (ref 0–0.2)
BASOPHILS NFR BLD: 0.2 % (ref 0–1.9)
BASOPHILS NFR BLD: 0.7 % (ref 0–1.9)
BILIRUB SERPL-MCNC: 16 MG/DL (ref 0.1–1)
BILIRUB SERPL-MCNC: 18.7 MG/DL (ref 0.1–1)
BILIRUB SERPL-MCNC: 2.4 MG/DL (ref 0.1–1)
BILIRUB UR QL STRIP: ABNORMAL
BLD GP AB SCN CELLS X3 SERPL QL: NORMAL
BNP SERPL-MCNC: 83 PG/ML (ref 0–99)
BUN SERPL-MCNC: 103 MG/DL (ref 8–23)
BUN SERPL-MCNC: 9 MG/DL (ref 8–23)
BUN SERPL-MCNC: 99 MG/DL (ref 8–23)
CALCIUM SERPL-MCNC: 8 MG/DL (ref 8.7–10.5)
CALCIUM SERPL-MCNC: 8.8 MG/DL (ref 8.7–10.5)
CALCIUM SERPL-MCNC: 9.2 MG/DL (ref 8.7–10.5)
CHLORIDE SERPL-SCNC: 87 MMOL/L (ref 95–110)
CHLORIDE SERPL-SCNC: 88 MMOL/L (ref 95–110)
CHLORIDE SERPL-SCNC: 90 MMOL/L (ref 95–110)
CHOLEST SERPL-MCNC: 200 MG/DL (ref 120–199)
CHOLEST/HDLC SERPL: 4 {RATIO} (ref 2–5)
CK SERPL-CCNC: 1190 U/L (ref 20–200)
CLARITY UR REFRACT.AUTO: ABNORMAL
CO2 SERPL-SCNC: 11 MMOL/L (ref 23–29)
CO2 SERPL-SCNC: 14 MMOL/L (ref 23–29)
CO2 SERPL-SCNC: 25 MMOL/L (ref 23–29)
COLOR UR AUTO: ABNORMAL
COMPLEXED PSA SERPL-MCNC: 1.9 NG/ML (ref 0–4)
CREAT SERPL-MCNC: 0.8 MG/DL (ref 0.5–1.4)
CREAT SERPL-MCNC: 0.8 MG/DL (ref 0.5–1.4)
CREAT SERPL-MCNC: 3.4 MG/DL (ref 0.5–1.4)
CREAT SERPL-MCNC: 3.6 MG/DL (ref 0.5–1.4)
CREAT SERPL-MCNC: 3.9 MG/DL (ref 0.5–1.4)
DELSYS: ABNORMAL
DIFFERENTIAL METHOD: ABNORMAL
DIFFERENTIAL METHOD: ABNORMAL
EOSINOPHIL # BLD AUTO: 0 K/UL (ref 0–0.5)
EOSINOPHIL # BLD AUTO: 0.1 K/UL (ref 0–0.5)
EOSINOPHIL NFR BLD: 0 % (ref 0–8)
EOSINOPHIL NFR BLD: 0.8 % (ref 0–8)
ERYTHROCYTE [DISTWIDTH] IN BLOOD BY AUTOMATED COUNT: 13.1 % (ref 11.5–14.5)
ERYTHROCYTE [DISTWIDTH] IN BLOOD BY AUTOMATED COUNT: 19.2 % (ref 11.5–14.5)
EST. GFR  (AFRICAN AMERICAN): 18.3 ML/MIN/1.73 M^2
EST. GFR  (AFRICAN AMERICAN): 19.6 ML/MIN/1.73 M^2
EST. GFR  (AFRICAN AMERICAN): >60 ML/MIN/1.73 M^2
EST. GFR  (AFRICAN AMERICAN): >60 ML/MIN/1.73 M^2
EST. GFR  (NON AFRICAN AMERICAN): 15.8 ML/MIN/1.73 M^2
EST. GFR  (NON AFRICAN AMERICAN): 16.9 ML/MIN/1.73 M^2
EST. GFR  (NON AFRICAN AMERICAN): >60 ML/MIN/1.73 M^2
EST. GFR  (NON AFRICAN AMERICAN): >60 ML/MIN/1.73 M^2
ESTIMATED AVG GLUCOSE: 94 MG/DL (ref 68–131)
FERRITIN SERPL-MCNC: 526 NG/ML (ref 20–300)
FIO2: 21
FLOW: 0
GIANT PLATELETS BLD QL SMEAR: PRESENT
GLUCOSE SERPL-MCNC: 63 MG/DL (ref 70–110)
GLUCOSE SERPL-MCNC: 64 MG/DL (ref 70–110)
GLUCOSE SERPL-MCNC: 99 MG/DL (ref 70–110)
GLUCOSE UR QL STRIP: ABNORMAL
HBA1C MFR BLD HPLC: 4.9 % (ref 4–5.6)
HCO3 UR-SCNC: 17.5 MMOL/L (ref 24–28)
HCT VFR BLD AUTO: 31.7 % (ref 40–54)
HCT VFR BLD AUTO: 37.8 % (ref 40–54)
HDLC SERPL-MCNC: 50 MG/DL (ref 40–75)
HDLC SERPL: 25 % (ref 20–50)
HGB BLD-MCNC: 11.3 G/DL (ref 14–18)
HGB BLD-MCNC: 13.2 G/DL (ref 14–18)
HGB UR QL STRIP: ABNORMAL
HYALINE CASTS UR QL AUTO: 19 /LPF
HYPOCHROMIA BLD QL SMEAR: ABNORMAL
IMM GRANULOCYTES # BLD AUTO: 0.05 K/UL (ref 0–0.04)
IMM GRANULOCYTES # BLD AUTO: 1.1 K/UL (ref 0–0.04)
IMM GRANULOCYTES NFR BLD AUTO: 0.6 % (ref 0–0.5)
IMM GRANULOCYTES NFR BLD AUTO: 4.5 % (ref 0–0.5)
INR PPP: 1.6 (ref 0.8–1.2)
IRON SERPL-MCNC: 68 UG/DL (ref 45–160)
KETONES UR QL STRIP: NEGATIVE
LACTATE SERPL-SCNC: 6.7 MMOL/L (ref 0.5–2.2)
LDLC SERPL CALC-MCNC: 136 MG/DL (ref 63–159)
LEUKOCYTE ESTERASE UR QL STRIP: ABNORMAL
LIPASE SERPL-CCNC: 16 U/L (ref 4–60)
LIPASE SERPL-CCNC: 71 U/L (ref 4–60)
LYMPHOCYTES # BLD AUTO: 0.8 K/UL (ref 1–4.8)
LYMPHOCYTES # BLD AUTO: 1 K/UL (ref 1–4.8)
LYMPHOCYTES NFR BLD: 3.9 % (ref 18–48)
LYMPHOCYTES NFR BLD: 8.9 % (ref 18–48)
MAGNESIUM SERPL-MCNC: 3.1 MG/DL (ref 1.6–2.6)
MCH RBC QN AUTO: 31.8 PG (ref 27–31)
MCH RBC QN AUTO: 33.2 PG (ref 27–31)
MCHC RBC AUTO-ENTMCNC: 34.9 G/DL (ref 32–36)
MCHC RBC AUTO-ENTMCNC: 35.6 G/DL (ref 32–36)
MCV RBC AUTO: 91 FL (ref 82–98)
MCV RBC AUTO: 93 FL (ref 82–98)
MICROSCOPIC COMMENT: ABNORMAL
MODE: ABNORMAL
MONOCYTES # BLD AUTO: 0.8 K/UL (ref 0.3–1)
MONOCYTES # BLD AUTO: 1 K/UL (ref 0.3–1)
MONOCYTES NFR BLD: 4.2 % (ref 4–15)
MONOCYTES NFR BLD: 9.1 % (ref 4–15)
NEUTROPHILS # BLD AUTO: 21.5 K/UL (ref 1.8–7.7)
NEUTROPHILS # BLD AUTO: 6.9 K/UL (ref 1.8–7.7)
NEUTROPHILS NFR BLD: 79.9 % (ref 38–73)
NEUTROPHILS NFR BLD: 87.2 % (ref 38–73)
NITRITE UR QL STRIP: NEGATIVE
NONHDLC SERPL-MCNC: 150 MG/DL
NRBC BLD-RTO: 0 /100 WBC
NRBC BLD-RTO: 0 /100 WBC
PCO2 BLDA: 39.7 MMHG (ref 35–45)
PH SMN: 7.25 [PH] (ref 7.35–7.45)
PH UR STRIP: 5 [PH] (ref 5–8)
PLATELET # BLD AUTO: 251 K/UL (ref 150–350)
PLATELET # BLD AUTO: 316 K/UL (ref 150–350)
PLATELET BLD QL SMEAR: ABNORMAL
PMV BLD AUTO: 10.4 FL (ref 9.2–12.9)
PMV BLD AUTO: 9.6 FL (ref 9.2–12.9)
PO2 BLDA: 97 MMHG (ref 80–100)
POC BE: -10 MMOL/L
POC SATURATED O2: 96 % (ref 95–100)
POC TCO2: 19 MMOL/L (ref 23–27)
POCT GLUCOSE: 71 MG/DL (ref 70–110)
POCT GLUCOSE: 94 MG/DL (ref 70–110)
POIKILOCYTOSIS BLD QL SMEAR: SLIGHT
POLYCHROMASIA BLD QL SMEAR: ABNORMAL
POTASSIUM SERPL-SCNC: 4.8 MMOL/L (ref 3.5–5.1)
POTASSIUM SERPL-SCNC: 6.4 MMOL/L (ref 3.5–5.1)
POTASSIUM SERPL-SCNC: 7 MMOL/L (ref 3.5–5.1)
PROCALCITONIN SERPL IA-MCNC: 3.69 NG/ML
PROT SERPL-MCNC: 4.3 G/DL (ref 6–8.4)
PROT SERPL-MCNC: 5.2 G/DL (ref 6–8.4)
PROT SERPL-MCNC: 6 G/DL (ref 6–8.4)
PROT UR QL STRIP: ABNORMAL
PROTHROMBIN TIME: 15.4 SEC (ref 9–12.5)
PROVIDER CREDENTIALS: ABNORMAL
PROVIDER NOTIFIED: ABNORMAL
RBC # BLD AUTO: 3.4 M/UL (ref 4.6–6.2)
RBC # BLD AUTO: 4.15 M/UL (ref 4.6–6.2)
RBC #/AREA URNS AUTO: 47 /HPF (ref 0–4)
SAMPLE: ABNORMAL
SAMPLE: ABNORMAL
SATURATED IRON: 22 % (ref 20–50)
SITE: ABNORMAL
SODIUM SERPL-SCNC: 123 MMOL/L (ref 136–145)
SODIUM SERPL-SCNC: 125 MMOL/L (ref 136–145)
SODIUM SERPL-SCNC: 125 MMOL/L (ref 136–145)
SP GR UR STRIP: 1.01 (ref 1–1.03)
SP02: 98
SQUAMOUS #/AREA URNS AUTO: 1 /HPF
TARGETS BLD QL SMEAR: ABNORMAL
TIME NOTIFIED: 1025
TOTAL IRON BINDING CAPACITY: 308 UG/DL (ref 250–450)
TRANSFERRIN SERPL-MCNC: 208 MG/DL (ref 200–375)
TRIGL SERPL-MCNC: 70 MG/DL (ref 30–150)
TROPONIN I SERPL DL<=0.01 NG/ML-MCNC: 0.03 NG/ML (ref 0–0.03)
TSH SERPL DL<=0.005 MIU/L-ACNC: 1.31 UIU/ML (ref 0.4–4)
TSH SERPL DL<=0.005 MIU/L-ACNC: 1.34 UIU/ML (ref 0.4–4)
URN SPEC COLLECT METH UR: ABNORMAL
VERBAL RESULT READBACK PERFORMED: YES
VIT B12 SERPL-MCNC: 1099 PG/ML (ref 210–950)
WBC # BLD AUTO: 24.65 K/UL (ref 3.9–12.7)
WBC # BLD AUTO: 8.57 K/UL (ref 3.9–12.7)
WBC #/AREA URNS AUTO: 29 /HPF (ref 0–5)
WBC CLUMPS UR QL AUTO: ABNORMAL

## 2019-01-01 PROCEDURE — 71260 CT THORAX DX C+: CPT | Mod: 26,HCNC,, | Performed by: RADIOLOGY

## 2019-01-01 PROCEDURE — S0028 INJECTION, FAMOTIDINE, 20 MG: HCPCS | Mod: HCNC | Performed by: STUDENT IN AN ORGANIZED HEALTH CARE EDUCATION/TRAINING PROGRAM

## 2019-01-01 PROCEDURE — 83036 HEMOGLOBIN GLYCOSYLATED A1C: CPT | Mod: HCNC

## 2019-01-01 PROCEDURE — 87040 BLOOD CULTURE FOR BACTERIA: CPT | Mod: HCNC

## 2019-01-01 PROCEDURE — 13132 PR RECMPL WND HEAD,FAC,HAND 2.6-7.5 CM: ICD-10-PCS | Mod: HCNC,51,S$GLB, | Performed by: DERMATOLOGY

## 2019-01-01 PROCEDURE — 99215 OFFICE O/P EST HI 40 MIN: CPT | Mod: HCNC,S$GLB,, | Performed by: FAMILY MEDICINE

## 2019-01-01 PROCEDURE — 84145 PROCALCITONIN (PCT): CPT | Mod: HCNC

## 2019-01-01 PROCEDURE — 94640 AIRWAY INHALATION TREATMENT: CPT | Mod: HCNC,S$GLB,, | Performed by: INTERNAL MEDICINE

## 2019-01-01 PROCEDURE — 74177 CT ABD & PELVIS W/CONTRAST: CPT | Mod: TC,HCNC

## 2019-01-01 PROCEDURE — 99202 PR OFFICE/OUTPT VISIT, NEW, LEVL II, 15-29 MIN: ICD-10-PCS | Mod: 57,HCNC,S$GLB, | Performed by: DERMATOLOGY

## 2019-01-01 PROCEDURE — 27201012 HC FORCEPS, HOT/COLD, DISP: Mod: HCNC | Performed by: INTERNAL MEDICINE

## 2019-01-01 PROCEDURE — D9220A PRA ANESTHESIA: Mod: HCNC,,, | Performed by: ANESTHESIOLOGY

## 2019-01-01 PROCEDURE — 83605 ASSAY OF LACTIC ACID: CPT | Mod: HCNC

## 2019-01-01 PROCEDURE — 80053 COMPREHEN METABOLIC PANEL: CPT | Mod: 91,HCNC

## 2019-01-01 PROCEDURE — 1101F PT FALLS ASSESS-DOCD LE1/YR: CPT | Mod: HCNC,CPTII,S$GLB, | Performed by: FAMILY MEDICINE

## 2019-01-01 PROCEDURE — 99999 PR PBB SHADOW E&M-EST. PATIENT-LVL III: CPT | Mod: PBBFAC,HCNC,, | Performed by: FAMILY MEDICINE

## 2019-01-01 PROCEDURE — 88341 IMHCHEM/IMCYTCHM EA ADD ANTB: CPT | Mod: 26,HCNC,, | Performed by: PATHOLOGY

## 2019-01-01 PROCEDURE — 25500020 PHARM REV CODE 255: Mod: HCNC | Performed by: INTERNAL MEDICINE

## 2019-01-01 PROCEDURE — 93010 ELECTROCARDIOGRAM REPORT: CPT | Mod: HCNC,,, | Performed by: INTERNAL MEDICINE

## 2019-01-01 PROCEDURE — G0105 COLORECTAL SCRN; HI RISK IND: ICD-10-PCS | Mod: HCNC,GC,, | Performed by: INTERNAL MEDICINE

## 2019-01-01 PROCEDURE — 25000003 PHARM REV CODE 250: Mod: HCNC | Performed by: STUDENT IN AN ORGANIZED HEALTH CARE EDUCATION/TRAINING PROGRAM

## 2019-01-01 PROCEDURE — 83690 ASSAY OF LIPASE: CPT | Mod: HCNC

## 2019-01-01 PROCEDURE — 17311: ICD-10-PCS | Mod: HCNC,S$GLB,, | Performed by: DERMATOLOGY

## 2019-01-01 PROCEDURE — 96365 THER/PROPH/DIAG IV INF INIT: CPT | Mod: HCNC

## 2019-01-01 PROCEDURE — 45378 DIAGNOSTIC COLONOSCOPY: CPT | Mod: HCNC | Performed by: INTERNAL MEDICINE

## 2019-01-01 PROCEDURE — 1101F PR PT FALLS ASSESS DOC 0-1 FALLS W/OUT INJ PAST YR: ICD-10-PCS | Mod: HCNC,CPTII,S$GLB, | Performed by: FAMILY MEDICINE

## 2019-01-01 PROCEDURE — 83540 ASSAY OF IRON: CPT | Mod: HCNC

## 2019-01-01 PROCEDURE — 84443 ASSAY THYROID STIM HORMONE: CPT | Mod: HCNC

## 2019-01-01 PROCEDURE — 37000009 HC ANESTHESIA EA ADD 15 MINS: Mod: HCNC | Performed by: INTERNAL MEDICINE

## 2019-01-01 PROCEDURE — 99999 PR PBB SHADOW E&M-EST. PATIENT-LVL III: ICD-10-PCS | Mod: PBBFAC,HCNC,, | Performed by: INTERNAL MEDICINE

## 2019-01-01 PROCEDURE — 87086 URINE CULTURE/COLONY COUNT: CPT | Mod: HCNC

## 2019-01-01 PROCEDURE — 94640 PR INHAL RX, AIRWAY OBST/DX SPUTUM INDUCT: ICD-10-PCS | Mod: HCNC,S$GLB,, | Performed by: INTERNAL MEDICINE

## 2019-01-01 PROCEDURE — 99292 CRITICAL CARE ADDL 30 MIN: CPT | Mod: ,,, | Performed by: EMERGENCY MEDICINE

## 2019-01-01 PROCEDURE — 99215 PR OFFICE/OUTPT VISIT, EST, LEVL V, 40-54 MIN: ICD-10-PCS | Mod: HCNC,S$GLB,, | Performed by: FAMILY MEDICINE

## 2019-01-01 PROCEDURE — 88312 SPECIAL STAINS GROUP 1: CPT | Mod: 26,HCNC,, | Performed by: PATHOLOGY

## 2019-01-01 PROCEDURE — 93005 ELECTROCARDIOGRAM TRACING: CPT | Mod: HCNC

## 2019-01-01 PROCEDURE — 82140 ASSAY OF AMMONIA: CPT | Mod: HCNC

## 2019-01-01 PROCEDURE — 88342 TISSUE SPECIMEN TO PATHOLOGY - SURGERY: ICD-10-PCS | Mod: 26,HCNC,, | Performed by: PATHOLOGY

## 2019-01-01 PROCEDURE — 82728 ASSAY OF FERRITIN: CPT | Mod: HCNC

## 2019-01-01 PROCEDURE — 13132 CMPLX RPR F/C/C/M/N/AX/G/H/F: CPT | Mod: HCNC,51,S$GLB, | Performed by: DERMATOLOGY

## 2019-01-01 PROCEDURE — 99292 PR CRITICAL CARE, ADDL 30 MIN: ICD-10-PCS | Mod: ,,, | Performed by: EMERGENCY MEDICINE

## 2019-01-01 PROCEDURE — 99024 POSTOP FOLLOW-UP VISIT: CPT | Mod: HCNC,S$GLB,, | Performed by: DERMATOLOGY

## 2019-01-01 PROCEDURE — 43239 PR EGD, FLEX, W/BIOPSY, SGL/MULTI: ICD-10-PCS | Mod: 51,HCNC,GC, | Performed by: INTERNAL MEDICINE

## 2019-01-01 PROCEDURE — 63600175 PHARM REV CODE 636 W HCPCS: Mod: HCNC | Performed by: STUDENT IN AN ORGANIZED HEALTH CARE EDUCATION/TRAINING PROGRAM

## 2019-01-01 PROCEDURE — D9220A PRA ANESTHESIA: ICD-10-PCS | Mod: HCNC,,, | Performed by: ANESTHESIOLOGY

## 2019-01-01 PROCEDURE — 43239 EGD BIOPSY SINGLE/MULTIPLE: CPT | Mod: HCNC | Performed by: INTERNAL MEDICINE

## 2019-01-01 PROCEDURE — 88305 TISSUE SPECIMEN TO PATHOLOGY - SURGERY: ICD-10-PCS | Mod: 26,HCNC,, | Performed by: PATHOLOGY

## 2019-01-01 PROCEDURE — 74177 CT ABDOMEN PELVIS WITH CONTRAST: ICD-10-PCS | Mod: 26,HCNC,, | Performed by: RADIOLOGY

## 2019-01-01 PROCEDURE — 1101F PR PT FALLS ASSESS DOC 0-1 FALLS W/OUT INJ PAST YR: ICD-10-PCS | Mod: HCNC,CPTII,S$GLB, | Performed by: INTERNAL MEDICINE

## 2019-01-01 PROCEDURE — 85025 COMPLETE CBC W/AUTO DIFF WBC: CPT | Mod: HCNC

## 2019-01-01 PROCEDURE — 25000003 PHARM REV CODE 250: Mod: HCNC | Performed by: NURSE ANESTHETIST, CERTIFIED REGISTERED

## 2019-01-01 PROCEDURE — 37000008 HC ANESTHESIA 1ST 15 MINUTES: Mod: HCNC | Performed by: INTERNAL MEDICINE

## 2019-01-01 PROCEDURE — 99292 CRITICAL CARE ADDL 30 MIN: CPT | Mod: HCNC

## 2019-01-01 PROCEDURE — 99214 OFFICE O/P EST MOD 30 MIN: CPT | Mod: 25,HCNC,S$GLB, | Performed by: INTERNAL MEDICINE

## 2019-01-01 PROCEDURE — 83735 ASSAY OF MAGNESIUM: CPT | Mod: HCNC

## 2019-01-01 PROCEDURE — 88341 PR IHC OR ICC EACH ADD'L SINGLE ANTIBODY  STAINPR: ICD-10-PCS | Mod: 26,HCNC,, | Performed by: PATHOLOGY

## 2019-01-01 PROCEDURE — 82607 VITAMIN B-12: CPT | Mod: HCNC

## 2019-01-01 PROCEDURE — 63600175 PHARM REV CODE 636 W HCPCS: Mod: HCNC | Performed by: INTERNAL MEDICINE

## 2019-01-01 PROCEDURE — 80053 COMPREHEN METABOLIC PANEL: CPT | Mod: HCNC

## 2019-01-01 PROCEDURE — 85610 PROTHROMBIN TIME: CPT | Mod: HCNC

## 2019-01-01 PROCEDURE — 82962 GLUCOSE BLOOD TEST: CPT | Mod: HCNC

## 2019-01-01 PROCEDURE — 71260 CT THORAX DX C+: CPT | Mod: TC,HCNC

## 2019-01-01 PROCEDURE — S0030 INJECTION, METRONIDAZOLE: HCPCS | Mod: HCNC | Performed by: STUDENT IN AN ORGANIZED HEALTH CARE EDUCATION/TRAINING PROGRAM

## 2019-01-01 PROCEDURE — G0439 PR MEDICARE ANNUAL WELLNESS SUBSEQUENT VISIT: ICD-10-PCS | Mod: HCNC,S$GLB,, | Performed by: NURSE PRACTITIONER

## 2019-01-01 PROCEDURE — 36600 WITHDRAWAL OF ARTERIAL BLOOD: CPT | Mod: HCNC

## 2019-01-01 PROCEDURE — 83880 ASSAY OF NATRIURETIC PEPTIDE: CPT | Mod: HCNC

## 2019-01-01 PROCEDURE — 99291 PR CRITICAL CARE, E/M 30-74 MINUTES: ICD-10-PCS | Mod: HCNC,,, | Performed by: INTERNAL MEDICINE

## 2019-01-01 PROCEDURE — 43239 EGD BIOPSY SINGLE/MULTIPLE: CPT | Mod: 51,HCNC,GC, | Performed by: INTERNAL MEDICINE

## 2019-01-01 PROCEDURE — G0439 PPPS, SUBSEQ VISIT: HCPCS | Mod: HCNC,S$GLB,, | Performed by: NURSE PRACTITIONER

## 2019-01-01 PROCEDURE — 99999 PR PBB SHADOW E&M-EST. PATIENT-LVL III: CPT | Mod: PBBFAC,HCNC,, | Performed by: INTERNAL MEDICINE

## 2019-01-01 PROCEDURE — 99999 PR PBB SHADOW E&M-EST. PATIENT-LVL III: ICD-10-PCS | Mod: PBBFAC,HCNC,, | Performed by: FAMILY MEDICINE

## 2019-01-01 PROCEDURE — 99291 CRITICAL CARE FIRST HOUR: CPT | Mod: 25,HCNC

## 2019-01-01 PROCEDURE — 99999 PR PBB SHADOW E&M-EST. PATIENT-LVL II: ICD-10-PCS | Mod: PBBFAC,HCNC,, | Performed by: DERMATOLOGY

## 2019-01-01 PROCEDURE — 93010 EKG 12-LEAD: ICD-10-PCS | Mod: HCNC,,, | Performed by: INTERNAL MEDICINE

## 2019-01-01 PROCEDURE — 74177 CT ABD & PELVIS W/CONTRAST: CPT | Mod: 26,HCNC,, | Performed by: RADIOLOGY

## 2019-01-01 PROCEDURE — 88341 IMHCHEM/IMCYTCHM EA ADD ANTB: CPT | Mod: HCNC | Performed by: PATHOLOGY

## 2019-01-01 PROCEDURE — 99999 PR PBB SHADOW E&M-EST. PATIENT-LVL II: CPT | Mod: PBBFAC,HCNC,, | Performed by: DERMATOLOGY

## 2019-01-01 PROCEDURE — 88342 IMHCHEM/IMCYTCHM 1ST ANTB: CPT | Mod: 26,HCNC,, | Performed by: PATHOLOGY

## 2019-01-01 PROCEDURE — 63600175 PHARM REV CODE 636 W HCPCS: Mod: HCNC | Performed by: EMERGENCY MEDICINE

## 2019-01-01 PROCEDURE — 99214 PR OFFICE/OUTPT VISIT, EST, LEVL IV, 30-39 MIN: ICD-10-PCS | Mod: 25,HCNC,S$GLB, | Performed by: INTERNAL MEDICINE

## 2019-01-01 PROCEDURE — 88312 TISSUE SPECIMEN TO PATHOLOGY - SURGERY: ICD-10-PCS | Mod: 26,HCNC,, | Performed by: PATHOLOGY

## 2019-01-01 PROCEDURE — 84484 ASSAY OF TROPONIN QUANT: CPT | Mod: HCNC

## 2019-01-01 PROCEDURE — 17312 MOHS ADDL STAGE: CPT | Mod: HCNC,S$GLB,, | Performed by: DERMATOLOGY

## 2019-01-01 PROCEDURE — 82803 BLOOD GASES ANY COMBINATION: CPT | Mod: HCNC

## 2019-01-01 PROCEDURE — 71260 CT CHEST WITH CONTRAST: ICD-10-PCS | Mod: 26,HCNC,, | Performed by: RADIOLOGY

## 2019-01-01 PROCEDURE — 99499 RISK ADDL DX/OHS AUDIT: ICD-10-PCS | Mod: HCNC,S$GLB,, | Performed by: NURSE PRACTITIONER

## 2019-01-01 PROCEDURE — 80061 LIPID PANEL: CPT | Mod: HCNC

## 2019-01-01 PROCEDURE — 96375 TX/PRO/DX INJ NEW DRUG ADDON: CPT | Mod: HCNC

## 2019-01-01 PROCEDURE — 88305 TISSUE EXAM BY PATHOLOGIST: CPT | Mod: 26,HCNC,, | Performed by: PATHOLOGY

## 2019-01-01 PROCEDURE — G0105 COLORECTAL SCRN; HI RISK IND: HCPCS | Mod: HCNC,GC,, | Performed by: INTERNAL MEDICINE

## 2019-01-01 PROCEDURE — 99291 PR CRITICAL CARE, E/M 30-74 MINUTES: ICD-10-PCS | Mod: ,,, | Performed by: EMERGENCY MEDICINE

## 2019-01-01 PROCEDURE — 99999 PR PBB SHADOW E&M-EST. PATIENT-LVL III: ICD-10-PCS | Mod: PBBFAC,HCNC,, | Performed by: NURSE PRACTITIONER

## 2019-01-01 PROCEDURE — 99024 PR POST-OP FOLLOW-UP VISIT: ICD-10-PCS | Mod: HCNC,S$GLB,, | Performed by: DERMATOLOGY

## 2019-01-01 PROCEDURE — 81001 URINALYSIS AUTO W/SCOPE: CPT | Mod: HCNC

## 2019-01-01 PROCEDURE — 63600175 PHARM REV CODE 636 W HCPCS: Mod: HCNC | Performed by: NURSE ANESTHETIST, CERTIFIED REGISTERED

## 2019-01-01 PROCEDURE — 17312: ICD-10-PCS | Mod: HCNC,S$GLB,, | Performed by: DERMATOLOGY

## 2019-01-01 PROCEDURE — 17311 MOHS 1 STAGE H/N/HF/G: CPT | Mod: HCNC,S$GLB,, | Performed by: DERMATOLOGY

## 2019-01-01 PROCEDURE — 99291 CRITICAL CARE FIRST HOUR: CPT | Mod: ,,, | Performed by: EMERGENCY MEDICINE

## 2019-01-01 PROCEDURE — 82550 ASSAY OF CK (CPK): CPT | Mod: HCNC

## 2019-01-01 PROCEDURE — 84153 ASSAY OF PSA TOTAL: CPT | Mod: HCNC

## 2019-01-01 PROCEDURE — 1101F PT FALLS ASSESS-DOCD LE1/YR: CPT | Mod: HCNC,CPTII,S$GLB, | Performed by: INTERNAL MEDICINE

## 2019-01-01 PROCEDURE — 86850 RBC ANTIBODY SCREEN: CPT | Mod: HCNC

## 2019-01-01 PROCEDURE — 82565 ASSAY OF CREATININE: CPT | Mod: HCNC

## 2019-01-01 PROCEDURE — 99202 OFFICE O/P NEW SF 15 MIN: CPT | Mod: 57,HCNC,S$GLB, | Performed by: DERMATOLOGY

## 2019-01-01 PROCEDURE — 36415 COLL VENOUS BLD VENIPUNCTURE: CPT | Mod: HCNC

## 2019-01-01 PROCEDURE — 99999 PR PBB SHADOW E&M-EST. PATIENT-LVL III: CPT | Mod: PBBFAC,HCNC,, | Performed by: NURSE PRACTITIONER

## 2019-01-01 PROCEDURE — 12000002 HC ACUTE/MED SURGE SEMI-PRIVATE ROOM: Mod: HCNC

## 2019-01-01 PROCEDURE — 99291 CRITICAL CARE FIRST HOUR: CPT | Mod: HCNC,,, | Performed by: INTERNAL MEDICINE

## 2019-01-01 PROCEDURE — 25000242 PHARM REV CODE 250 ALT 637 W/ HCPCS: Mod: HCNC | Performed by: NURSE ANESTHETIST, CERTIFIED REGISTERED

## 2019-01-01 PROCEDURE — 88305 TISSUE EXAM BY PATHOLOGIST: CPT | Mod: HCNC | Performed by: PATHOLOGY

## 2019-01-01 PROCEDURE — 99499 UNLISTED E&M SERVICE: CPT | Mod: HCNC,S$GLB,, | Performed by: NURSE PRACTITIONER

## 2019-01-01 RX ORDER — METRONIDAZOLE 500 MG/100ML
500 INJECTION, SOLUTION INTRAVENOUS ONCE
Status: DISCONTINUED | OUTPATIENT
Start: 2019-01-01 | End: 2019-01-01

## 2019-01-01 RX ORDER — FLUTICASONE PROPIONATE AND SALMETEROL 250; 50 UG/1; UG/1
POWDER RESPIRATORY (INHALATION)
Qty: 180 EACH | Refills: 1 | Status: SHIPPED | OUTPATIENT
Start: 2019-01-01

## 2019-01-01 RX ORDER — METRONIDAZOLE 500 MG/100ML
500 INJECTION, SOLUTION INTRAVENOUS
Status: DISCONTINUED | OUTPATIENT
Start: 2019-01-01 | End: 2019-01-01

## 2019-01-01 RX ORDER — ONDANSETRON 4 MG/1
4-8 TABLET, ORALLY DISINTEGRATING ORAL EVERY 6 HOURS PRN
Qty: 30 TABLET | Refills: 0 | Status: SHIPPED | OUTPATIENT
Start: 2019-01-01

## 2019-01-01 RX ORDER — PANTOPRAZOLE SODIUM 40 MG/1
40 TABLET, DELAYED RELEASE ORAL DAILY
Qty: 90 TABLET | Refills: 3 | Status: SHIPPED | OUTPATIENT
Start: 2019-01-01 | End: 2020-07-29

## 2019-01-01 RX ORDER — PROMETHAZINE HYDROCHLORIDE 6.25 MG/5ML
12.5-25 SYRUP ORAL EVERY 4 HOURS PRN
Qty: 240 ML | Refills: 0 | Status: SHIPPED | OUTPATIENT
Start: 2019-01-01

## 2019-01-01 RX ORDER — SODIUM, POTASSIUM,MAG SULFATES 17.5-3.13G
SOLUTION, RECONSTITUTED, ORAL ORAL
Qty: 1 BOTTLE | Refills: 0 | Status: SHIPPED | OUTPATIENT
Start: 2019-01-01 | End: 2019-01-01 | Stop reason: ALTCHOICE

## 2019-01-01 RX ORDER — ONDANSETRON 2 MG/ML
INJECTION INTRAMUSCULAR; INTRAVENOUS
Status: DISCONTINUED | OUTPATIENT
Start: 2019-01-01 | End: 2019-01-01

## 2019-01-01 RX ORDER — POLYETHYLENE GLYCOL-3350 AND ELECTROLYTES 236; 6.74; 5.86; 2.97; 22.74 G/274.31G; G/274.31G; G/274.31G; G/274.31G; G/274.31G
POWDER, FOR SOLUTION ORAL
Refills: 0 | COMMUNITY
Start: 2019-01-01 | End: 2019-01-01 | Stop reason: ALTCHOICE

## 2019-01-01 RX ORDER — SPIRONOLACTONE 25 MG/1
25 TABLET ORAL 2 TIMES DAILY
Qty: 60 TABLET | Refills: 0 | Status: SHIPPED | OUTPATIENT
Start: 2019-01-01 | End: 2020-08-06

## 2019-01-01 RX ORDER — SODIUM CHLORIDE 9 MG/ML
INJECTION, SOLUTION INTRAVENOUS CONTINUOUS
Status: DISCONTINUED | OUTPATIENT
Start: 2019-01-01 | End: 2019-01-01 | Stop reason: HOSPADM

## 2019-01-01 RX ORDER — PANTOPRAZOLE SODIUM 40 MG/10ML
40 INJECTION, POWDER, LYOPHILIZED, FOR SOLUTION INTRAVENOUS DAILY
Status: DISCONTINUED | OUTPATIENT
Start: 2019-08-12 | End: 2019-01-01

## 2019-01-01 RX ORDER — DEXAMETHASONE SODIUM PHOSPHATE 4 MG/ML
INJECTION, SOLUTION INTRA-ARTICULAR; INTRALESIONAL; INTRAMUSCULAR; INTRAVENOUS; SOFT TISSUE
Status: DISCONTINUED | OUTPATIENT
Start: 2019-01-01 | End: 2019-01-01

## 2019-01-01 RX ORDER — PHENYLEPHRINE HYDROCHLORIDE 10 MG/ML
INJECTION INTRAVENOUS
Status: DISCONTINUED | OUTPATIENT
Start: 2019-01-01 | End: 2019-01-01

## 2019-01-01 RX ORDER — FENTANYL CITRATE 50 UG/ML
25 INJECTION, SOLUTION INTRAMUSCULAR; INTRAVENOUS EVERY 5 MIN PRN
Status: DISCONTINUED | OUTPATIENT
Start: 2019-01-01 | End: 2019-01-01 | Stop reason: HOSPADM

## 2019-01-01 RX ORDER — PANTOPRAZOLE SODIUM 40 MG/10ML
80 INJECTION, POWDER, LYOPHILIZED, FOR SOLUTION INTRAVENOUS ONCE
Status: DISCONTINUED | OUTPATIENT
Start: 2019-01-01 | End: 2019-01-01

## 2019-01-01 RX ORDER — LORAZEPAM 2 MG/ML
2 INJECTION INTRAMUSCULAR
Status: DISCONTINUED | OUTPATIENT
Start: 2019-01-01 | End: 2019-01-01 | Stop reason: HOSPADM

## 2019-01-01 RX ORDER — POLYETHYLENE GLYCOL 3350, SODIUM SULFATE ANHYDROUS, SODIUM BICARBONATE, SODIUM CHLORIDE, POTASSIUM CHLORIDE 236; 22.74; 6.74; 5.86; 2.97 G/4L; G/4L; G/4L; G/4L; G/4L
4 POWDER, FOR SOLUTION ORAL ONCE
Qty: 4000 ML | Refills: 0 | Status: SHIPPED | OUTPATIENT
Start: 2019-01-01 | End: 2019-01-01

## 2019-01-01 RX ORDER — PROPOFOL 10 MG/ML
VIAL (ML) INTRAVENOUS
Status: DISCONTINUED | OUTPATIENT
Start: 2019-01-01 | End: 2019-01-01

## 2019-01-01 RX ORDER — SODIUM CHLORIDE 0.9 % (FLUSH) 0.9 %
10 SYRINGE (ML) INJECTION
Status: DISCONTINUED | OUTPATIENT
Start: 2019-01-01 | End: 2019-01-01 | Stop reason: HOSPADM

## 2019-01-01 RX ORDER — FUROSEMIDE 20 MG/1
20-40 TABLET ORAL 2 TIMES DAILY
Qty: 120 TABLET | Refills: 1 | Status: SHIPPED | OUTPATIENT
Start: 2019-01-01 | End: 2020-07-31

## 2019-01-01 RX ORDER — MORPHINE SULFATE 4 MG/ML
4 INJECTION, SOLUTION INTRAMUSCULAR; INTRAVENOUS
Status: DISCONTINUED | OUTPATIENT
Start: 2019-01-01 | End: 2019-01-01 | Stop reason: HOSPADM

## 2019-01-01 RX ORDER — IPRATROPIUM BROMIDE AND ALBUTEROL SULFATE 2.5; .5 MG/3ML; MG/3ML
3 SOLUTION RESPIRATORY (INHALATION) EVERY 4 HOURS
Status: DISCONTINUED | OUTPATIENT
Start: 2019-01-01 | End: 2019-01-01

## 2019-01-01 RX ORDER — LIDOCAINE HCL/PF 100 MG/5ML
SYRINGE (ML) INTRAVENOUS
Status: DISCONTINUED | OUTPATIENT
Start: 2019-01-01 | End: 2019-01-01

## 2019-01-01 RX ORDER — ALBUTEROL SULFATE 0.83 MG/ML
2.5 SOLUTION RESPIRATORY (INHALATION) EVERY 6 HOURS PRN
Qty: 75 ML | Refills: 1 | Status: SHIPPED | OUTPATIENT
Start: 2019-01-01 | End: 2020-08-06

## 2019-01-01 RX ORDER — ALBUTEROL SULFATE 0.83 MG/ML
2.5 SOLUTION RESPIRATORY (INHALATION)
Status: COMPLETED | OUTPATIENT
Start: 2019-01-01 | End: 2019-01-01

## 2019-01-01 RX ORDER — PANTOPRAZOLE SODIUM 40 MG/10ML
80 INJECTION, POWDER, LYOPHILIZED, FOR SOLUTION INTRAVENOUS DAILY
Status: DISCONTINUED | OUTPATIENT
Start: 2019-01-01 | End: 2019-01-01

## 2019-01-01 RX ORDER — ROCURONIUM BROMIDE 10 MG/ML
INJECTION, SOLUTION INTRAVENOUS
Status: DISCONTINUED | OUTPATIENT
Start: 2019-01-01 | End: 2019-01-01

## 2019-01-01 RX ORDER — SUCCINYLCHOLINE CHLORIDE 20 MG/ML
INJECTION INTRAMUSCULAR; INTRAVENOUS
Status: DISCONTINUED | OUTPATIENT
Start: 2019-01-01 | End: 2019-01-01

## 2019-01-01 RX ORDER — LORAZEPAM 2 MG/ML
1 INJECTION INTRAMUSCULAR
Status: DISCONTINUED | OUTPATIENT
Start: 2019-01-01 | End: 2019-01-01

## 2019-01-01 RX ORDER — FAMOTIDINE 10 MG/ML
20 INJECTION INTRAVENOUS DAILY
Status: DISCONTINUED | OUTPATIENT
Start: 2019-01-01 | End: 2019-01-01 | Stop reason: HOSPADM

## 2019-01-01 RX ORDER — CEPHALEXIN 500 MG/1
500 CAPSULE ORAL 3 TIMES DAILY
Qty: 21 CAPSULE | Refills: 0 | Status: SHIPPED | OUTPATIENT
Start: 2019-01-01 | End: 2019-01-01

## 2019-01-01 RX ORDER — ALBUTEROL SULFATE 90 UG/1
AEROSOL, METERED RESPIRATORY (INHALATION)
Status: DISCONTINUED | OUTPATIENT
Start: 2019-01-01 | End: 2019-01-01

## 2019-01-01 RX ORDER — VANCOMYCIN 2 GRAM/500 ML IN 0.9 % SODIUM CHLORIDE INTRAVENOUS
2000 ONCE
Status: DISCONTINUED | OUTPATIENT
Start: 2019-01-01 | End: 2019-01-01

## 2019-01-01 RX ORDER — SODIUM CHLORIDE 0.9 % (FLUSH) 0.9 %
3 SYRINGE (ML) INJECTION
Status: DISCONTINUED | OUTPATIENT
Start: 2019-01-01 | End: 2019-01-01 | Stop reason: HOSPADM

## 2019-01-01 RX ORDER — KETAMINE HCL IN 0.9 % NACL 50 MG/5 ML
SYRINGE (ML) INTRAVENOUS
Status: DISCONTINUED | OUTPATIENT
Start: 2019-01-01 | End: 2019-01-01

## 2019-01-01 RX ADMIN — CEFTRIAXONE 2 G: 2 INJECTION, SOLUTION INTRAVENOUS at 11:08

## 2019-01-01 RX ADMIN — ALBUTEROL SULFATE 2 PUFF: 90 AEROSOL, METERED RESPIRATORY (INHALATION) at 11:07

## 2019-01-01 RX ADMIN — IOHEXOL 75 ML: 350 INJECTION, SOLUTION INTRAVENOUS at 01:07

## 2019-01-01 RX ADMIN — METRONIDAZOLE 500 MG: 500 SOLUTION INTRAVENOUS at 01:08

## 2019-01-01 RX ADMIN — FAMOTIDINE 20 MG: 10 INJECTION, SOLUTION INTRAVENOUS at 03:08

## 2019-01-01 RX ADMIN — LIDOCAINE HYDROCHLORIDE 100 MG: 20 INJECTION, SOLUTION INTRAVENOUS at 11:07

## 2019-01-01 RX ADMIN — DEXAMETHASONE SODIUM PHOSPHATE 4 MG: 4 INJECTION, SOLUTION INTRAMUSCULAR; INTRAVENOUS at 11:07

## 2019-01-01 RX ADMIN — ONDANSETRON 4 MG: 2 INJECTION INTRAMUSCULAR; INTRAVENOUS at 11:07

## 2019-01-01 RX ADMIN — Medication 10 MG: at 11:07

## 2019-01-01 RX ADMIN — PHENYLEPHRINE HYDROCHLORIDE 100 MCG: 10 INJECTION INTRAVENOUS at 11:07

## 2019-01-01 RX ADMIN — SODIUM CHLORIDE, SODIUM LACTATE, POTASSIUM CHLORIDE, AND CALCIUM CHLORIDE 1000 ML: .6; .31; .03; .02 INJECTION, SOLUTION INTRAVENOUS at 11:08

## 2019-01-01 RX ADMIN — LORAZEPAM 2 MG: 2 INJECTION INTRAMUSCULAR; INTRAVENOUS at 03:08

## 2019-01-01 RX ADMIN — ALBUTEROL SULFATE 2.5 MG: 0.83 SOLUTION RESPIRATORY (INHALATION) at 03:07

## 2019-01-01 RX ADMIN — IOHEXOL 1000 ML: 9 SOLUTION ORAL at 01:07

## 2019-01-01 RX ADMIN — Medication 30 MG: at 11:07

## 2019-01-01 RX ADMIN — SODIUM CHLORIDE 1000 ML: 0.9 INJECTION, SOLUTION INTRAVENOUS at 12:08

## 2019-01-01 RX ADMIN — SUCCINYLCHOLINE CHLORIDE 120 MG: 20 INJECTION, SOLUTION INTRAMUSCULAR; INTRAVENOUS at 11:07

## 2019-01-01 RX ADMIN — SODIUM CHLORIDE: 0.9 INJECTION, SOLUTION INTRAVENOUS at 10:07

## 2019-01-01 RX ADMIN — PHENYLEPHRINE HYDROCHLORIDE 200 MCG: 10 INJECTION INTRAVENOUS at 11:07

## 2019-01-01 RX ADMIN — IOHEXOL 75 ML: 350 INJECTION, SOLUTION INTRAVENOUS at 12:07

## 2019-01-01 RX ADMIN — PHENYLEPHRINE HYDROCHLORIDE 300 MCG: 10 INJECTION INTRAVENOUS at 11:07

## 2019-01-01 RX ADMIN — PROPOFOL 50 MG: 10 INJECTION, EMULSION INTRAVENOUS at 11:07

## 2019-01-01 RX ADMIN — MORPHINE SULFATE 4 MG: 4 INJECTION INTRAVENOUS at 03:08

## 2019-01-01 RX ADMIN — ROCURONIUM BROMIDE 5 MG: 10 INJECTION, SOLUTION INTRAVENOUS at 11:07

## 2019-01-01 RX ADMIN — VANCOMYCIN HYDROCHLORIDE 1500 MG: 1.5 INJECTION, POWDER, LYOPHILIZED, FOR SOLUTION INTRAVENOUS at 11:08

## 2019-02-21 ENCOUNTER — APPOINTMENT (RX ONLY)
Dept: URBAN - METROPOLITAN AREA CLINIC 98 | Facility: CLINIC | Age: 74
Setting detail: DERMATOLOGY
End: 2019-02-21

## 2019-02-21 DIAGNOSIS — D485 NEOPLASM OF UNCERTAIN BEHAVIOR OF SKIN: ICD-10-CM

## 2019-02-21 DIAGNOSIS — L21.8 OTHER SEBORRHEIC DERMATITIS: ICD-10-CM

## 2019-02-21 DIAGNOSIS — L82.1 OTHER SEBORRHEIC KERATOSIS: ICD-10-CM

## 2019-02-21 DIAGNOSIS — L29.8 OTHER PRURITUS: ICD-10-CM

## 2019-02-21 DIAGNOSIS — L29.89 OTHER PRURITUS: ICD-10-CM

## 2019-02-21 DIAGNOSIS — L90.5 SCAR CONDITIONS AND FIBROSIS OF SKIN: ICD-10-CM

## 2019-02-21 DIAGNOSIS — L82.0 INFLAMED SEBORRHEIC KERATOSIS: ICD-10-CM

## 2019-02-21 DIAGNOSIS — L57.0 ACTINIC KERATOSIS: ICD-10-CM

## 2019-02-21 PROBLEM — D48.5 NEOPLASM OF UNCERTAIN BEHAVIOR OF SKIN: Status: ACTIVE | Noted: 2019-02-21

## 2019-02-21 PROCEDURE — ? COUNSELING

## 2019-02-21 PROCEDURE — ? TREATMENT REGIMEN

## 2019-02-21 PROCEDURE — 17000 DESTRUCT PREMALG LESION: CPT | Mod: 59

## 2019-02-21 PROCEDURE — ? BIOPSY BY SHAVE METHOD

## 2019-02-21 PROCEDURE — ? PRESCRIPTION

## 2019-02-21 PROCEDURE — 99203 OFFICE O/P NEW LOW 30 MIN: CPT | Mod: 25

## 2019-02-21 PROCEDURE — 17110 DESTRUCTION B9 LES UP TO 14: CPT

## 2019-02-21 PROCEDURE — 11102 TANGNTL BX SKIN SINGLE LES: CPT | Mod: 59

## 2019-02-21 PROCEDURE — ? LIQUID NITROGEN

## 2019-02-21 RX ORDER — HYDROCORTISONE 25 MG/G
CREAM TOPICAL
Qty: 1 | Refills: 2 | Status: ERX | COMMUNITY
Start: 2019-02-21

## 2019-02-21 RX ORDER — KETOCONAZOLE 20 MG/G
CREAM TOPICAL
Qty: 1 | Refills: 2 | Status: ERX | COMMUNITY
Start: 2019-02-21

## 2019-02-21 RX ADMIN — KETOCONAZOLE: 20 CREAM TOPICAL at 15:36

## 2019-02-21 RX ADMIN — HYDROCORTISONE: 25 CREAM TOPICAL at 15:36

## 2019-02-21 ASSESSMENT — LOCATION SIMPLE DESCRIPTION DERM
LOCATION SIMPLE: RIGHT SHOULDER
LOCATION SIMPLE: RIGHT LOWER BACK
LOCATION SIMPLE: LEFT SHOULDER
LOCATION SIMPLE: POSTERIOR NECK
LOCATION SIMPLE: CHEST
LOCATION SIMPLE: RIGHT ANTERIOR NECK
LOCATION SIMPLE: LEFT LOWER BACK
LOCATION SIMPLE: RIGHT UPPER BACK
LOCATION SIMPLE: RIGHT EAR
LOCATION SIMPLE: UPPER BACK
LOCATION SIMPLE: RIGHT CHEEK
LOCATION SIMPLE: NOSE
LOCATION SIMPLE: LEFT UPPER BACK
LOCATION SIMPLE: ABDOMEN
LOCATION SIMPLE: LEFT CHEEK
LOCATION SIMPLE: RIGHT FOREHEAD
LOCATION SIMPLE: LEFT EAR
LOCATION SIMPLE: LEFT ANTERIOR NECK

## 2019-02-21 ASSESSMENT — LOCATION DETAILED DESCRIPTION DERM
LOCATION DETAILED: RIGHT POSTERIOR SHOULDER
LOCATION DETAILED: LEFT MEDIAL INFERIOR CHEST
LOCATION DETAILED: RIGHT SUPERIOR MEDIAL MIDBACK
LOCATION DETAILED: RIGHT CLAVICULAR NECK
LOCATION DETAILED: NASAL DORSUM
LOCATION DETAILED: RIGHT SUPERIOR LATERAL MALAR CHEEK
LOCATION DETAILED: RIGHT LATERAL TRAPEZIAL NECK
LOCATION DETAILED: LEFT CLAVICULAR NECK
LOCATION DETAILED: RIGHT SUPERIOR MEDIAL FOREHEAD
LOCATION DETAILED: LEFT INFERIOR MEDIAL MIDBACK
LOCATION DETAILED: RIGHT CENTRAL MALAR CHEEK
LOCATION DETAILED: RIGHT SUPERIOR CRUS OF ANTIHELIX
LOCATION DETAILED: LEFT LATERAL MALAR CHEEK
LOCATION DETAILED: LEFT SUPERIOR HELIX
LOCATION DETAILED: LEFT POSTERIOR SHOULDER
LOCATION DETAILED: SUPERIOR THORACIC SPINE
LOCATION DETAILED: PERIUMBILICAL SKIN
LOCATION DETAILED: RIGHT LATERAL UPPER BACK
LOCATION DETAILED: LEFT MID-UPPER BACK

## 2019-02-21 ASSESSMENT — LOCATION ZONE DERM
LOCATION ZONE: NOSE
LOCATION ZONE: TRUNK
LOCATION ZONE: EAR
LOCATION ZONE: NECK
LOCATION ZONE: ARM
LOCATION ZONE: FACE

## 2019-02-21 NOTE — HPI: OTHER
Condition:: Itching on back
Please Describe Your Condition:: For months with no treatment, present today for treatment

## 2019-02-21 NOTE — PROCEDURE: TREATMENT REGIMEN
Initiate Treatment: Wash with Zbar QD\\nApply Ketoconazole 2% cream QD\\nApply Hydrocortisone 2.5% cream QD for up to 2 weeks, or use 2-3 days a week for flares
Detail Level: Zone

## 2019-02-21 NOTE — HPI: SKIN LESIONS
Is This A New Presentation, Or A Follow-Up?: Skin Lesions
How Severe Is Your Skin Lesion?: mild
Have Your Skin Lesions Been Treated?: not been treated
Is This A New Presentation, Or A Follow-Up?: Growth

## 2019-02-21 NOTE — PROCEDURE: LIQUID NITROGEN
Include Z78.9 (Other Specified Conditions Influencing Health Status) As An Associated Diagnosis?: No
Post-Care Instructions: I reviewed with the patient in detail post-care instructions. Patient is to wear sunprotection, and avoid picking at any of the treated lesions. Pt may apply Vaseline to crusted or scabbing areas.
Duration Of Freeze Thaw-Cycle (Seconds): 5-10
Number Of Freeze-Thaw Cycles: 1 freeze-thaw cycle
Medical Necessity Clause: This procedure was medically necessary because the lesions that were treated were:
Medical Necessity Information: It is in your best interest to select a reason for this procedure from the list below. All of these items fulfill various CMS LCD requirements except the new and changing color options.
Consent: The patient's consent was obtained including but not limited to risks of crusting, scabbing, blistering, scarring, darker or lighter pigmentary change, recurrence, incomplete removal and infection.
Detail Level: Detailed
Duration Of Freeze Thaw-Cycle (Seconds): 0

## 2019-02-21 NOTE — PROCEDURE: COUNSELING
Detail Level: Detailed
Patient Specific Counseling (Will Not Stick From Patient To Patient): Moisturize with anti itch lotion daily \\nTalk with pcp about getting a CT scan

## 2019-02-21 NOTE — PROCEDURE: BIOPSY BY SHAVE METHOD
X Size Of Lesion In Cm: 0
Bill 44670 For Specimen Handling/Conveyance To Laboratory?: no
Electrodesiccation Text: The wound bed was treated with electrodesiccation after the biopsy was performed.
Depth Of Biopsy: dermis
Biopsy Type: H and E
Type Of Destruction Used: Curettage
Billing Type: Third-Party Bill
Dressing: bandage
Electrodesiccation And Curettage Text: The wound bed was treated with electrodesiccation and curettage after the biopsy was performed.
Anesthesia Volume In Cc (Will Not Render If 0): 0.5
Was A Bandage Applied: Yes
Post-Care Instructions: I reviewed with the patient in detail post-care instructions. Patient is to keep the biopsy site dry overnight, and then apply bacitracin twice daily until healed. Patient may apply hydrogen peroxide soaks to remove any crusting.
Silver Nitrate Text: The wound bed was treated with silver nitrate after the biopsy was performed.
Hemostasis: Drysol
Curettage Text: The wound bed was treated with curettage after the biopsy was performed.
Notification Instructions: Patient will be notified of biopsy results. However, patient instructed to call the office if not contacted within 2 weeks.
Lab: [id number]
Biopsy Method: double edge Personna blade
Detail Level: Simple
Anesthesia Type: 1% lidocaine with epinephrine
Consent: Written consent was obtained and risks were reviewed including but not limited to scarring, infection, bleeding, scabbing, incomplete removal, nerve damage and allergy to anesthesia.
Wound Care: Petrolatum
Cryotherapy Text: The wound bed was treated with cryotherapy after the biopsy was performed.
Lab Facility: [id number]

## 2019-03-18 ENCOUNTER — APPOINTMENT (RX ONLY)
Dept: URBAN - METROPOLITAN AREA CLINIC 98 | Facility: CLINIC | Age: 74
Setting detail: DERMATOLOGY
End: 2019-03-18

## 2019-03-18 DIAGNOSIS — D485 NEOPLASM OF UNCERTAIN BEHAVIOR OF SKIN: ICD-10-CM

## 2019-03-18 DIAGNOSIS — L21.8 OTHER SEBORRHEIC DERMATITIS: ICD-10-CM

## 2019-03-18 DIAGNOSIS — L53.8 OTHER SPECIFIED ERYTHEMATOUS CONDITIONS: ICD-10-CM

## 2019-03-18 PROBLEM — D48.5 NEOPLASM OF UNCERTAIN BEHAVIOR OF SKIN: Status: ACTIVE | Noted: 2019-03-18

## 2019-03-18 PROCEDURE — ? BIOPSY BY SHAVE METHOD

## 2019-03-18 PROCEDURE — ? TREATMENT REGIMEN

## 2019-03-18 PROCEDURE — 11102 TANGNTL BX SKIN SINGLE LES: CPT

## 2019-03-18 PROCEDURE — 99213 OFFICE O/P EST LOW 20 MIN: CPT | Mod: 25

## 2019-03-18 PROCEDURE — ? COUNSELING

## 2019-03-18 ASSESSMENT — LOCATION ZONE DERM: LOCATION ZONE: FACE

## 2019-03-18 ASSESSMENT — LOCATION SIMPLE DESCRIPTION DERM
LOCATION SIMPLE: LEFT CHEEK
LOCATION SIMPLE: RIGHT CHEEK

## 2019-03-18 ASSESSMENT — LOCATION DETAILED DESCRIPTION DERM
LOCATION DETAILED: LEFT CENTRAL MALAR CHEEK
LOCATION DETAILED: LEFT MEDIAL MALAR CHEEK
LOCATION DETAILED: RIGHT CENTRAL MALAR CHEEK
LOCATION DETAILED: RIGHT INFERIOR MEDIAL MALAR CHEEK

## 2019-03-18 ASSESSMENT — SEVERITY ASSESSMENT: HOW SEVERE IS THIS PATIENT'S CONDITION?: ALMOST CLEAR

## 2019-03-18 ASSESSMENT — PAIN INTENSITY VAS: HOW INTENSE IS YOUR PAIN 0 BEING NO PAIN, 10 BEING THE MOST SEVERE PAIN POSSIBLE?: NO PAIN

## 2019-03-18 NOTE — PROCEDURE: BIOPSY BY SHAVE METHOD
Dressing: bandage
Type Of Destruction Used: Curettage
Anesthesia Volume In Cc (Will Not Render If 0): 0.5
Electrodesiccation And Curettage Text: The wound bed was treated with electrodesiccation and curettage after the biopsy was performed.
Post-Care Instructions: I reviewed with the patient in detail post-care instructions. Patient is to keep the biopsy site dry overnight, and then apply bacitracin twice daily until healed. Patient may apply hydrogen peroxide soaks to remove any crusting.
Biopsy Method: double edge Personna blade
Notification Instructions: Patient will be notified of biopsy results. However, patient instructed to call the office if not contacted within 2 weeks.
Curettage Text: The wound bed was treated with curettage after the biopsy was performed.
Detail Level: Simple
Lab: [id number]
Silver Nitrate Text: The wound bed was treated with silver nitrate after the biopsy was performed.
Hemostasis: Drysol
Was A Bandage Applied: Yes
Bill For Surgical Tray: no
Anesthesia Type: 1% lidocaine with epinephrine
Additional Anesthesia Volume In Cc (Will Not Render If 0): 0
Cryotherapy Text: The wound bed was treated with cryotherapy after the biopsy was performed.
Lab Facility: [id number]
Consent: Written consent was obtained and risks were reviewed including but not limited to scarring, infection, bleeding, scabbing, incomplete removal, nerve damage and allergy to anesthesia.
Depth Of Biopsy: dermis
Biopsy Type: H and E
Wound Care: Petrolatum
Billing Type: Third-Party Bill
Electrodesiccation Text: The wound bed was treated with electrodesiccation after the biopsy was performed.

## 2019-04-09 NOTE — PROGRESS NOTES
"Louis Nickerson presented for a  Medicare AWV and comprehensive Health Risk Assessment today. The following components were reviewed and updated:    · Medical history  · Family History  · Social history  · Allergies and Current Medications  · Health Risk Assessment  · Health Maintenance  · Care Team     ** See Completed Assessments for Annual Wellness Visit within the encounter summary.**       The following assessments were completed:  · Living Situation  · CAGE  · Depression Screening  · Timed Get Up and Go  · Whisper Test  · Cognitive Function Screening  · Nutrition Screening  · ADL Screening  · PAQ Screening          Vitals:    04/09/19 0929   BP: 116/70   BP Location: Left arm   Patient Position: Sitting   Pulse: 106   SpO2: 99%   Weight: 72 kg (158 lb 11.7 oz)   Height: 5' 8" (1.727 m)     Body mass index is 24.14 kg/m².     Physical Exam   Constitutional: He is oriented to person, place, and time. He appears well-developed and well-nourished.   HENT:   Head: Normocephalic and atraumatic. Not macrocephalic and not microcephalic. Head is without raccoon's eyes, without Roldan's sign, without abrasion, without contusion, without laceration, without right periorbital erythema and without left periorbital erythema. Hair is normal.   Right Ear: No lacerations. No drainage, swelling or tenderness. No foreign bodies. No mastoid tenderness. Tympanic membrane is not injected, not scarred, not perforated, not erythematous, not retracted and not bulging. Tympanic membrane mobility is normal. No middle ear effusion. No hemotympanum. No decreased hearing is noted.   Left Ear: No lacerations. No drainage, swelling or tenderness. No foreign bodies. No mastoid tenderness. Tympanic membrane is not injected, not scarred, not perforated, not erythematous, not retracted and not bulging. Tympanic membrane mobility is normal.  No middle ear effusion. No hemotympanum. No decreased hearing is noted.   Nose: Nose normal. No mucosal " edema, rhinorrhea, nose lacerations, sinus tenderness or nasal deformity.   Mouth/Throat: Uvula is midline.   Eyes: Conjunctivae and lids are normal. No scleral icterus.   Neck: Trachea normal. Neck supple. No spinous process tenderness and no muscular tenderness present. No neck rigidity. No edema, no erythema and normal range of motion present. No thyroid mass and no thyromegaly present.   Cardiovascular: Normal rate, regular rhythm, normal heart sounds and intact distal pulses. Exam reveals no gallop and no friction rub.   No murmur heard.  Pulmonary/Chest: Effort normal and breath sounds normal. No stridor. No respiratory distress. He has no wheezes. He has no rales. He exhibits no tenderness.   Abdominal: Soft. Bowel sounds are normal. He exhibits no distension.   Musculoskeletal: Normal range of motion.   Lymphadenopathy:        Head (right side): No submental, no submandibular, no tonsillar, no preauricular and no posterior auricular adenopathy present.        Head (left side): No submental, no submandibular, no tonsillar, no preauricular, no posterior auricular and no occipital adenopathy present.   Neurological: He is alert and oriented to person, place, and time.   Skin: Skin is warm and dry.   Psychiatric: He has a normal mood and affect. His behavior is normal. Judgment and thought content normal.   Vitals reviewed.      Diagnoses and health risks identified today and associated recommendations/orders:    1. Encounter for preventive health examination  Annual Health Risk Assessment (HRA) visit today.  Counseling and referral of health maintenance and preventative health measures performed.  Patient given annual wellness paperwork to take home.  Encouraged to return in 1 year for subsequent HRA visit.     2. Other emphysema  Chronic. Stable. Continue current treatment plan as previously prescribed by PCP.    3. Prehypertension  Chronic. Stable. Continue current treatment plan as previously prescribed by  PCP.    4. Hyperlipidemia, unspecified hyperlipidemia type  Chronic. Stable. Continue current treatment plan as previously prescribed by PCP.    5. Tobacco use disorder  Chronic. Stable. Continue current treatment plan as previously prescribed by PCP.    6. Alcohol use disorder, mild, abuse  Chronic. Stable. Continue current treatment plan as previously prescribed by PCP.    7. Marijuana smoker, episodic  Chronic. Stable. Continue current treatment plan as previously prescribed by PCP.      Provided Louis with a 5-10 year written screening schedule and personal prevention plan. Recommendations were developed using the USPSTF age appropriate recommendations. Education, counseling, and referrals were provided as needed. After Visit Summary printed and given to patient which includes a list of additional screenings\tests needed.    Follow up in about 1 year (around 4/9/2020).    Manuel Rendon NP  I offered to discuss end of life issues, including information on how to make advance directives that the patient could use to name someone who would make medical decisions on their behalf if they became too ill to make themselves.    ___Patient declined  _X_Patient is interested, I provided paper work and offered to discuss.

## 2019-04-09 NOTE — PATIENT INSTRUCTIONS
Counseling and Referral of Other Preventative  (Italic type indicates deductible and co-insurance are waived)    Patient Name: Louis Nickerson  Today's Date: 4/9/2019    Health Maintenance       Date Due Completion Date    LDCT Lung Screen 10/08/2000 ---    Influenza Vaccine 08/01/2018 10/25/2016    Colonoscopy 01/19/2019 1/19/2015    Override on 1/19/2015: Done    Lipid Panel 05/04/2023 5/4/2018    TETANUS VACCINE 05/04/2028 5/4/2018        No orders of the defined types were placed in this encounter.    The following information is provided to all patients.  This information is to help you find resources for any of the problems found today that may be affecting your health:                Living healthy guide: www.[facility].[place].gov      Understanding Diabetes: www.diabetes.org      Eating healthy: www.cdc.gov/healthyweight      [facility] home safety checklist: www.cdc.gov/steadi/patient.html      Agency on Aging: www.goea.[place].gov      Alcoholics anonymous (AA): www.aa.org      Physical Activity: www.cheri.nih.gov/ti3rqtf      Tobacco use: www.quitwithusla.org

## 2019-04-25 NOTE — TELEPHONE ENCOUNTER
I called the pt to set up a consult appt with Dr. Del Cid per Dr. Nicholson. The pt wants to have the consult and surgery on the same day.  I faxed over the path report and office notes to Dr. Del Cid's office.

## 2019-05-07 NOTE — TELEPHONE ENCOUNTER
"Tried to contact patient to schedule mohs for BCC on L cheek, referral from Dr. Nicholson but could not leave a message, mail box said "full".  "

## 2019-05-13 NOTE — TELEPHONE ENCOUNTER
Pt is now scheduled for same day surgery on 5/28 at 800 am. Pt confirmed date, time and location. Pt is already familiar with the Mohs procedure. Will send pic via email. Appointment letter and pre-op sheet sent in the mail.

## 2019-05-24 NOTE — TELEPHONE ENCOUNTER
----- Message from Tami Patterson sent at 5/24/2019  9:09 AM CDT -----  Contact: lisa Del Cid- pt Needs Advice    Reason for call: pt is calling to speak with the nurse pt is having Moh's surgery next Tuesday pt has a few questions he needs to ask the nurse about a cream he has been using called Ketoconazole cream and pt said he is asking if he needs to stop using the cream before his procedure next week         Communication Preference: can you please call pt at [phone]    Additional Information: none    KAMILLE

## 2019-05-24 NOTE — TELEPHONE ENCOUNTER
Advised patient to stop using any topicals at all on or near the area of surgery. Pt expressed verbal understanding.

## 2019-05-28 NOTE — PROGRESS NOTES
PROCEDURE: Mohs' Micrographic Surgery    INDICATION: Location in non-mask areas of face where maximum conservation of tumor-free tissue is needed. Biopsy-proven skin cancer of cosmetically and functionally important areas, including head, neck, genital, hand, foot, or areas known for having difficulty in healing, such as the lower anterior legs. Tumor with ill-defined borders.    REFERRING MD: Aniyah Nicholson MD    CASE NUMBER:     ANESTHETIC: 5 cc 0.5% Lidocaine with Epi 1:200,000 mixed 1:1 with 0.5% Bupivacaine    SURGICAL PREP: Hibiclens    SURGEON: Donal Del Cid MD    ASSISTANTS: Juliet Brito PA-C, Ignacia Simon MA and Alesha Capps, Surg Tech    PREOPERATIVE DIAGNOSIS: basal cell carcinoma    POSTOPERATIVE DIAGNOSIS: basal cell carcinoma    PATHOLOGIC DIAGNOSIS: basal cell carcinoma- nodular, micronodular    HISTOLOGY OF SPECIMENS IN FIRST STAGE:   Tumor Type: Tumor seen. Nodular basal cell carcinoma: Nodular tumor in dermis composed of basaloid cells exhibiting peripheral palisading and retraction artifact.  Micronodular basal cell carcinoma: Tumor in dermis composed of basaloid cells in small nodular aggregates exhibiting peripheral palisading and retraction artifact.  Epidermal margin missing.   Depth of Invasion: epidermis and dermis  Perineural Invasion: No    HISTOLOGY OF SPECIMENS IN SUBSEQUENT STAGES:  · Tumor Type: No tumor seen.    STAGES OF MOHS' SURGERY PERFORMED: 2    TUMOR-FREE PLANE ACHIEVED: Yes    HEMOSTASIS: electrocoagulation     SPECIMENS: 4 (2 in stage A and 2 in stage B)    LOCATION: left (medial) cheek. Patient verified location with hand held mirror.    INITIAL LESION SIZE: 0.5 x 0.7 cm    FINAL DEFECT SIZE: 1.3 x 1.7 cm    WOUND REPAIR/DISPOSITION: The patient tolerated Mohs' Micrographic Surgery for a basal cell carcinoma very well. When the tumor was completely removed, a repair of the surgical defect was undertaken.      PROCEDURE: Complex Linear Repair    INDICATION: Status post  "Mohs' Micrographic Surgery for basal cell carcinoma.    CASE NUMBER:     SURGEON: Donal Del Cid MD    ASSISTANTS: Juliet Brito PA-C and Luz Bustillo    ANESTHETIC: 3 cc 1% Lidocaine with Epinephrine 1:100,000    SURGICAL PREP: Hibiclens, prepped by Luz Bustillo    LOCATION: left (medial) cheek    DEFECT SIZE: 1.3 x 1.7 cm    WOUND REPAIR/DISPOSITION:  After the patient's carcinoma had been completely removed with Mohs' Micrographic Surgery, a repair of the surgical defect was undertaken. The patient was returned to the operating suite where the area of left medial cheek was prepped, draped, and anesthetized in the usual sterile fashion. The wound was widely undermined in all directions. Then, electrocoagulation was used to obtain meticulous hemostasis. 5-0 Vicryl buried vertical mattress sutures were placed into the subcutaneous and dermal plane to close the wound and christa the cutaneous wound edge. Bilateral dog ears were identified and were removed by a standard Burow's triangle technique. The cutaneous wound edges were closed using interrupted 5-0 Prolene suture.    The patient tolerated the procedure well.    The area was cleaned and dressed appropriately and the patient was given wound care instructions, as well as appointment for follow-up evaluation. Patient declined pain medication and was placed on Keflex 500 mg TID x 7 days.    LENGTH OF REPAIR: 3.6 cm    Vitals:    05/28/19 0753 05/28/19 1037   BP: (!) 152/87 127/77   BP Location: Right arm Left arm   Patient Position: Sitting Sitting   BP Method: Small (Automatic) Small (Automatic)   Pulse: 95 93   Weight: 71.7 kg (158 lb)    Height: 5' 8" (1.727 m)          "

## 2019-05-28 NOTE — PROGRESS NOTES
REFERRING MD:  Aniyah Nicholson MD    CHIEF COMPLAINT:  New patient being consulted for Mohs' surgery evaluation.    HISTORY OF PRESENT ILLNESS:  73 y.o. male presents with a 6-7 week(s) history of growth on the L cheek.    Negative for scabbing.  Negative for crusting.  Negative for bleeding.  Negative for itching.    Biopsy consistent with basal cell carcinoma.     No prior treatment.    Pacemaker: No  Defibrillator: No  Artificial joints: No  Artificial heart valves: No    PAST MEDICAL HISTORY:  Past Medical History:   Diagnosis Date    Anxiety     Basal cell carcinoma     Cancer 1987    skin CA on nose    Colon polyps 1/20/2015    Noted on colonoscopy report from 1/19/2015 from [facility].  Polyps removed.      COPD (chronic obstructive pulmonary disease)     Depression     Diverticulosis 1/20/2015    Rib fracture     T8 - noted on chest xray       PAST SURGICAL HISTORY:  Past Surgical History:   Procedure Laterality Date    COLON SURGERY      polpy removal    DENTAL SURGERY  2009    NOSE SURGERY  1996    cancer on tip of nose was removed    TONSILLECTOMY          SOCIAL HISTORY:  Dependencies:  smokes (1.5 packs/day)    PERTINENT MEDICATIONS:  See medications list.  aspirin    ALLERGIES:  Patient has no known allergies.    ROS:  Skin: See HPI  Constitutional: No fatigue, fever, malaise, weight loss, or night sweats.  Cardiovascular: No chest pain, palpitations, or edema.  Respiratory: No coughing, wheezing, SOB, or sputum production.    Physical Exam   HENT:   Head:             General: Mood and affect normal. Alert and orient X3. Normal appearance.  Eyelids:  no suspicious lesions  Head/Face: L medial cheek with a 5 x 7 mm pink pearly papule located 1.6 cm laterally from the left alar base and 2 cm superiorly.   Lips/Teeth/Gums:  no suspicious lesions     IMPRESSION:  Biopsy proven nodular basal cell carcinoma, L cheek, path# [id number].    PLAN:  The  diagnosis and the pathology report were discussed in detail with the patient. Treatment options were reviewed, including Mohs Micrographic Surgery, radiation, topical therapy, and standard excision.  After careful review of patient's history and physical exam, and after discussion of treatment options, the decision was made to perform Mohs micrographic surgery.    Risks, benefits, and alternatives of Mohs' surgery discussed with the patient. Discussed repair options including complex closure, skin flap, skin graft and second intention healing with the patient. Patient elected to proceed with Mohs surgery today.     Consulting report is sent to the consulting provider.

## 2019-06-04 NOTE — TELEPHONE ENCOUNTER
Pt was scheduled for suture removal today on left cheek. Pt stated that he forgot about the appointment and was rescheduled for tomorrow 6/5/19 at 9:50. Pt verbally confirmed new appointment date and time.

## 2019-06-05 NOTE — PROGRESS NOTES
73 y.o. male patient is here for suture removal following Mohs' surgery.    Patient reports no problems.    WOUND PE:  The L cheek sutures intact. Wound healing well. Good skin edges. No signs or symptoms of infection. L lower eyelid in normal anatomic position.     IMPRESSION:  Healing operative site from Mohs' surgery, BCC L cheek s/p Mohs with CLC, postop day #7.    PLAN:  Sutures removed today. Steri-strips applied.  Continue wound care.  Keep moist with Aquaphor.  Offered 1 month recheck - pt prefers to call if needed.     RTC:  In 3-6 months with Aniyah Nicholson MD for skin check or sooner if new concern arises.

## 2019-07-23 NOTE — PROGRESS NOTES
Respiratory treatment [place]. Pt tolerating well  NDC:  6884-0610-96  LOT:  [id number]  EXP:  03/2020

## 2019-07-23 NOTE — PROGRESS NOTES
"Subjective:       Patient ID: Louis Nickerson is a 73 y.o. male.    Chief Complaint: Edema (stomach); Extremity Weakness; Fatigue; and Wheezing        Increased abdominal girth over 6-9 months. Increased fatigue that has significantly worsened over the past 8 weeks. Decreased appetite also progressing. Occasional dull ache across abdomen, throbbing in nature. Smoker.       Review of Systems    Objective:      Vitals:    07/23/19 1449   BP: 122/62   Pulse: (!) 117   SpO2: 95%   Weight: 79.8 kg (175 lb 14.8 oz)   Height: 5' 9" (1.753 m)      Physical Exam   Constitutional: He is oriented to person, place, and time. He appears well-developed and well-nourished. No distress.   HENT:   Head: Normocephalic and atraumatic.   Mouth/Throat: Oropharynx is clear and moist. No oropharyngeal exudate.   Eyes: Pupils are equal, round, and reactive to light. Conjunctivae and EOM are normal. No scleral icterus.   Neck: No thyromegaly present.   Cardiovascular: Normal rate, regular rhythm and normal heart sounds.   No murmur heard.  Pulmonary/Chest: Effort normal and breath sounds normal. He has no wheezes. He has no rales.   Abdominal: Soft. He exhibits mass. He exhibits no distension. Bowel sounds are decreased. There is no tenderness.   Musculoskeletal: He exhibits no edema or tenderness.   Lymphadenopathy:     He has no cervical adenopathy.   Neurological: He is alert and oriented to person, place, and time.   Skin: Skin is warm and dry.   Psychiatric: He has a normal mood and affect. His behavior is normal.       Assessment:       1. Annual physical exam    2. Epigastric mass    3. Fatigue, unspecified type    4. Abnormal finding of blood chemistry     5. Changes in skin texture     6. Anemia, unspecified type    7. Disturbance of skin sensation         Plan:       Louis was seen today for edema, extremity weakness, fatigue and wheezing.    Diagnoses and all orders for this visit:    Annual physical exam  -     Comprehensive " metabolic panel; Future  -     Lipid panel; Future  -     TSH; Future  -     CBC auto differential; Future  -     Hemoglobin A1c; Future    Epigastric mass  Will start with basic labs, anemia labs and CT ab/ pelvis. Needs colonoscopy, smoker. Next step to find primary per test above.   -     Lipase; Future  -     CT Abdomen Pelvis With Contrast; Future    Fatigue, unspecified type  -     Comprehensive metabolic panel; Future  -     TSH; Future  -     CBC auto differential; Future  -     Hemoglobin A1c; Future  -     Ferritin; Future  -     Iron and TIBC; Future  -     Vitamin B12; Future    Abnormal finding of blood chemistry   -     Lipid panel; Future  -     Hemoglobin A1c; Future  -     Creatinine, serum; Future    Changes in skin texture   -     TSH; Future    Anemia, unspecified type  -     Ferritin; Future  -     Iron and TIBC; Future  -     Vitamin B12; Future    Disturbance of skin sensation   -     Vitamin B12; Future    Other orders  -     albuterol nebulizer solution 2.5 mg           Jameson Clifford MD  Internal Medicine-[facility]        Side effects of medication(s) were discussed in detail and patient voiced understanding.  Patient will call back for any issues or complications.

## 2019-07-26 NOTE — TELEPHONE ENCOUNTER
Patient's wife reports he is having nausea with food. It happens when he eats anything heavy. Threw up twice after CT. No problems with bowel movements, appetite decreased due to feeling uncomfortable. He is able to keep down simple foods like soup, grits, and oatmeal if he takes small bites. Is also having swelling in bilateral legs from toes up to knees. Skin is shiny from the swelling. No weeping noted. Not using stockings but is elevating legs, which is not helping much. Reclining helps with abdominal discomfort. Discussed emergency prompts with patient with regard to decreased appetite and being unable to keep regular food down. Patient verbalized understanding and had no further questions or concerns. Sent message to Dr. Rodriguez

## 2019-07-26 NOTE — PROGRESS NOTES
Request received from his PCP, Dr. Rodriguez, to assist in expedited evaluation of dysphagia and liver lesions.  Patient with colonoscopy in 2015 during which several polyps were removed (records in media tab).      Patient needs EGD and colonoscopy.  Orders placed.

## 2019-07-26 NOTE — TELEPHONE ENCOUNTER
----- Message from Jelena Ontiveros sent at 7/26/2019  9:50 AM CDT -----  Contact: Wife- Iliana  Type: Patient Call Back    Who called: Self     What is the request in detail: patient's wife would like to speak with you to find out the patient's next steps . She would like to speak with someone today.    Can the clinic reply by [facility]? No     Would the patient rather a call back or a response via [facility]?  Call     Best call back number: [phone]

## 2019-07-30 PROBLEM — R13.10 DYSPHAGIA: Status: ACTIVE | Noted: 2019-01-01

## 2019-07-30 NOTE — DISCHARGE INSTRUCTIONS

## 2019-07-30 NOTE — TELEPHONE ENCOUNTER
Spoke to Mr. Nickerson wife which stated that Mr. Nickerson was unable to have the CT Saturday, so he is Scheduled for 07/31/19.  Patient is currently having a colonoscopy and wife states that he has a lot of swelling in hands, feet, and abdoman.  Wife states that patient is also vomiting after her eats.  Wife would like to know if he can have some of that fluid drained.

## 2019-07-30 NOTE — H&P
Short Stay Endoscopy History and Physical    PCP - Jameson Rodriguez MD     Procedure - EGD, colonoscopy  ASA - per anesthesia  Mallampati - per anesthesia  History of Anesthesia problems - no  Family history Anesthesia problems -  no   Plan of anesthesia - General    HPI:  This is a 73 y.o. male with recent findings of hepatic lesions on CT concerning for metastasis, here for evaluation of GI tract to exclude malignancy. Last colonoscopy was in 2015 with findings of 1.2cm semi-pedunculated polyp in the transverse colon, removed with hot snare, 1cm semi-pedunculated polyp in the sigmoid colon removed with hot snare, and two polyps in the rectum removed.      Reflux - no  Dysphagia - occasional  Abdominal pain - yes  Diarrhea - no    ROS:  Constitutional: No fevers, chills, No weight loss  CV: No chest pain  Pulm: No cough, No shortness of breath  Ophtho: No vision changes  GI: see HPI  Derm: No rash    Medical History:  has a past medical history of Anxiety, Basal cell carcinoma, Cancer (1987), Colon polyps (1/20/2015), COPD (chronic obstructive pulmonary disease), Depression, Diverticulosis (1/20/2015), and Rib fracture.    Surgical History:  has a past surgical history that includes Tonsillectomy; Nose surgery (1996); Dental surgery (2009); and Colon surgery.    Family History: family history includes Alzheimer's disease in his maternal grandfather; Cancer in his father, paternal grandfather, and paternal grandmother; Dementia in his sister; Hypertension in his father and mother; No Known Problems in his sister and sister; Stroke in his mother.. Otherwise no colon cancer, inflammatory bowel disease, or GI malignancies.    Social History:  reports that he has been smoking cigarettes.  He started smoking about 59 years ago. He has a 84.00 pack-year smoking history. He has never used smokeless tobacco. He reports that he drinks alcohol. He reports that he has current or past drug history. Drug:  Marijuana.    Review of patient's allergies indicates:  No Known Allergies    Medications:   Medications Prior to Admission   Medication Sig Dispense Refill Last Dose    albuterol (VENTOLIN HFA) 90 mcg/actuation inhaler Inhale 1-2 puffs into the lungs every 6 (six) hours as needed for Wheezing. Rescue 18 g 1 7/29/2019 at Unknown time    aspirin 81 MG Chew Take 81 mg by mouth once daily.   7/29/2019 at Unknown time    co-enzyme Q-10 30 mg capsule Take 30 mg by mouth 3 (three) times daily.   7/29/2019 at Unknown time    promethazine (PHENERGAN) 6.25 mg/5 mL syrup Take 10-20 mLs (12.5-25 mg total) by mouth every 4 (four) hours as needed for Nausea. 240 mL 0 Past Week at Unknown time    WIXELA INHUB 250-50 mcg/dose diskus inhaler INHALE ONE PUFF INTO LUNGS TWICE DAILY 180 each 1 7/29/2019 at Unknown time    ketoconazole (NIZORAL) 2 % cream Apply topically once daily. 60 g 0 Taking    multivitamin capsule Take 1 capsule by mouth once daily.   More than a month at Unknown time    ondansetron (ZOFRAN-ODT) 4 MG TbDL Take 1-2 tablets (4-8 mg total) by mouth every 6 (six) hours as needed. 30 tablet 0 More than a month at Unknown time    sodium,potassium,mag sulfates (SUPREP BOWEL PREP KIT) 17.5-3.13-1.6 gram SolR As Directed 1 Bottle 0        Physical Exam:    Vital Signs:   Vitals:    07/30/19 1033   BP: 106/67   Pulse: (!) 124   Resp: 20   Temp: 97.9 °F (36.6 °C)       General Appearance: Well appearing in no acute distress  Heart:  Tachycardiac, regular rate, S1, S2 normal, no murmurs heard.  Abdomen: Mildly distended, non tender  Extremities: +pitting edema  Skin: No rash    Labs:  Lab Results   Component Value Date    WBC 8.57 07/23/2019    HGB 13.2 (L) 07/23/2019    HCT 37.8 (L) 07/23/2019     07/23/2019    CHOL 200 (H) 07/23/2019    TRIG 70 07/23/2019    HDL 50 07/23/2019    ALT 91 (H) 07/23/2019     (H) 07/23/2019     (L) 07/23/2019    K 4.8 07/23/2019    CL 88 (L) 07/23/2019    CREATININE  0.8 07/23/2019    CREATININE 0.8 07/23/2019    BUN 9 07/23/2019    CO2 25 07/23/2019    TSH 1.310 07/23/2019    PSA 1.9 07/23/2019    HGBA1C 4.9 07/23/2019       I have explained the risks and benefits of endoscopy procedures to the patient including but not limited to bleeding, perforation, infection, and death.  The patient was asked if they understand and allowed to ask any further questions to their satisfaction.    Tom Petersen MD PGY-VI  Gastroenterology Fellow  [facility]  [phone]

## 2019-07-30 NOTE — [employer]OVATION PATIENT INSTRUCTIONS
Discharge Summary/Instructions after an Endoscopic Procedure  Patient Name: Louis Nickerson  Patient MRN: [id number]  Patient YOB: 1945  Tuesday, July 30, 2019  Von Brennan MD  RESTRICTIONS:  During your procedure today, you received medications for sedation.  These   medications may affect your judgment, balance and coordination.  Therefore,   for 24 hours, you have the following restrictions:   - DO NOT drive a car, operate machinery, make legal/financial decisions,   sign important papers or drink alcohol.    ACTIVITY:  Today: no heavy lifting, straining or running due to procedural   sedation/anesthesia.  The following day: return to full activity including work.  DIET:  Eat and drink normally unless instructed otherwise.     TREATMENT FOR COMMON SIDE EFFECTS:  - Mild abdominal pain, nausea, belching, bloating or excessive gas:  rest,   eat lightly and use a heating pad.  - Sore Throat: treat with throat lozenges and/or gargle with warm salt   water.  - Because air was used during the procedure, expelling large amounts of air   from your rectum or belching is normal.  - If a bowel prep was taken, you may not have a bowel movement for 1-3 days.    This is normal.  SYMPTOMS TO WATCH FOR AND REPORT TO YOUR PHYSICIAN:  1. Abdominal pain or bloating, other than gas cramps.  2. Chest pain.  3. Back pain.  4. Signs of infection such as: chills or fever occurring within 24 hours   after the procedure.  5. Rectal bleeding, which would show as bright red, maroon, or black stools.   (A tablespoon of blood from the rectum is not serious, especially if   hemorrhoids are present.)  6. Vomiting.  7. Weakness or dizziness.  GO DIRECTLY TO THE NEAREST EMERGENCY ROOM IF YOU HAVE ANY OF THE FOLLOWING:      Difficulty breathing              Chills and/or fever over 101 F   Persistent vomiting and/or vomiting blood   Severe abdominal pain   Severe chest pain   Black, tarry stools   Bleeding- more than one tablespoon   Any  other symptom or condition that you feel may need urgent attention  Your doctor recommends these additional instructions:  If any biopsies were taken, your doctors clinic will contact you in 1 to 2   weeks with any results.  - Patient has a contact number available for emergencies.  The signs and   symptoms of potential delayed complications were discussed with the   patient.  Return to normal activities tomorrow.  Written discharge   instructions were provided to the patient.   - Await pathology results.   - Use Protonix (pantoprazole) 40 mg PO daily daily.   - The findings and recommendations were discussed with the designated   responsible adult.   - Discharge patient to home.  For questions, problems or results please call your physician - Von Brennan MD at Work:  [phone].  [facility], EMERGENCY ROOM PHONE NUMBER: [phone]  IF A COMPLICATION OR EMERGENCY SITUATION ARISES AND YOU ARE UNABLE TO REACH   YOUR PHYSICIAN - GO DIRECTLY TO THE EMERGENCY ROOM.  Von Brennan MD  7/30/2019 1:26:13 PM  This report has been verified and signed electronically.  [employer]

## 2019-07-30 NOTE — PROVATION PATIENT INSTRUCTIONS
Discharge Summary/Instructions after an Endoscopic Procedure  Patient Name: Louis Nickerson  Patient MRN: [id number]  Patient YOB: 1945  Tuesday, July 30, 2019  Von Brennan MD  RESTRICTIONS:  During your procedure today, you received medications for sedation.  These   medications may affect your judgment, balance and coordination.  Therefore,   for 24 hours, you have the following restrictions:   - DO NOT drive a car, operate machinery, make legal/financial decisions,   sign important papers or drink alcohol.    ACTIVITY:  Today: no heavy lifting, straining or running due to procedural   sedation/anesthesia.  The following day: return to full activity including work.  DIET:  Eat and drink normally unless instructed otherwise.     TREATMENT FOR COMMON SIDE EFFECTS:  - Mild abdominal pain, nausea, belching, bloating or excessive gas:  rest,   eat lightly and use a heating pad.  - Sore Throat: treat with throat lozenges and/or gargle with warm salt   water.  - Because air was used during the procedure, expelling large amounts of air   from your rectum or belching is normal.  - If a bowel prep was taken, you may not have a bowel movement for 1-3 days.    This is normal.  SYMPTOMS TO WATCH FOR AND REPORT TO YOUR PHYSICIAN:  1. Abdominal pain or bloating, other than gas cramps.  2. Chest pain.  3. Back pain.  4. Signs of infection such as: chills or fever occurring within 24 hours   after the procedure.  5. Rectal bleeding, which would show as bright red, maroon, or black stools.   (A tablespoon of blood from the rectum is not serious, especially if   hemorrhoids are present.)  6. Vomiting.  7. Weakness or dizziness.  GO DIRECTLY TO THE NEAREST EMERGENCY ROOM IF YOU HAVE ANY OF THE FOLLOWING:      Difficulty breathing              Chills and/or fever over 101 F   Persistent vomiting and/or vomiting blood   Severe abdominal pain   Severe chest pain   Black, tarry stools   Bleeding- more than one tablespoon   Any  other symptom or condition that you feel may need urgent attention  Your doctor recommends these additional instructions:  If any biopsies were taken, your doctors clinic will contact you in 1 to 2   weeks with any results.  - Written discharge instructions were provided to the patient.   - Return to referring physician as previously scheduled.   - The findings and recommendations were discussed with the designated   responsible adult.   - Discharge patient to home.   - Repeat colonoscopy is not recommended for surveillance.  For questions, problems or results please call your physician - Von Brennan MD at Work:  [phone].  [facility], EMERGENCY ROOM PHONE NUMBER: [phone]  IF A COMPLICATION OR EMERGENCY SITUATION ARISES AND YOU ARE UNABLE TO REACH   YOUR PHYSICIAN - GO DIRECTLY TO THE EMERGENCY ROOM.  Von Brennan MD  7/30/2019 1:27:12 PM  This report has been verified and signed electronically.  [employer]

## 2019-07-30 NOTE — TELEPHONE ENCOUNTER
----- Message from Frances Eagle sent at 7/30/2019 11:01 AM CDT -----  Contact: Self   Type: Patient Call Back    What is the request in detail: Pt calling to speak to a nurse regarding swelling in abdomen area.     Can the clinic reply by [facility]? No    Would the patient rather a call back or a response via [facility]? Call back    Best call back number: [phone].

## 2019-07-30 NOTE — H&P
Short Stay Endoscopy History and Physical    PCP - Jameson Rodriguez MD  Referring Physician - Jameson Rodriguez MD  [address]  [address]  [place] [address]    Procedure - egd and colon  ASA - per anesthesia  Mallampati - per anesthesia  History of Anesthesia problems - no  Family history Anesthesia problems -  no   Plan of anesthesia - General    HPI:  This is a 73 y.o. male here for evaluation of: adeno of unknown primary    Reflux - no  Dysphagia - no  Abdominal pain - no  Diarrhea - no    ROS:  Constitutional: No fevers, chills, No weight loss  CV: No chest pain  Pulm: No cough, No shortness of breath  Ophtho: No vision changes  GI: see HPI  Derm: No rash    Medical History:  has a past medical history of Anxiety, Basal cell carcinoma, Cancer (1987), Colon polyps (1/20/2015), COPD (chronic obstructive pulmonary disease), Depression, Diverticulosis (1/20/2015), and Rib fracture.    Surgical History:  has a past surgical history that includes Tonsillectomy; Nose surgery (1996); Dental surgery (2009); and Colon surgery.    Family History: family history includes Alzheimer's disease in his maternal grandfather; Cancer in his father, paternal grandfather, and paternal grandmother; Dementia in his sister; Hypertension in his father and mother; No Known Problems in his sister and sister; Stroke in his mother..    Social History:  reports that he has been smoking cigarettes.  He started smoking about 59 years ago. He has a 84.00 pack-year smoking history. He has never used smokeless tobacco. He reports that he drinks alcohol. He reports that he has current or past drug history. Drug: Marijuana.    Review of patient's allergies indicates:  No Known Allergies    Medications:   Medications Prior to Admission   Medication Sig Dispense Refill Last Dose    aspirin 81 MG Chew Take 81 mg by mouth once daily.   7/29/2019 at Unknown time    co-enzyme Q-10 30 mg capsule Take 30 mg by mouth 3 (three) times  daily.   7/29/2019 at Unknown time    promethazine (PHENERGAN) 6.25 mg/5 mL syrup Take 10-20 mLs (12.5-25 mg total) by mouth every 4 (four) hours as needed for Nausea. 240 mL 0 Past Week at Unknown time    WIXELA INHUB 250-50 mcg/dose diskus inhaler INHALE ONE PUFF INTO LUNGS TWICE DAILY 180 each 1 7/29/2019 at Unknown time    albuterol (VENTOLIN HFA) 90 mcg/actuation inhaler Inhale 1-2 puffs into the lungs every 6 (six) hours as needed for Wheezing. Rescue 18 g 1 More than a month at Unknown time    ketoconazole (NIZORAL) 2 % cream Apply topically once daily. 60 g 0 Taking    multivitamin capsule Take 1 capsule by mouth once daily.   More than a month at Unknown time    ondansetron (ZOFRAN-ODT) 4 MG TbDL Take 1-2 tablets (4-8 mg total) by mouth every 6 (six) hours as needed. 30 tablet 0 More than a month at Unknown time    sodium,potassium,mag sulfates (SUPREP BOWEL PREP KIT) 17.5-3.13-1.6 gram SolR As Directed 1 Bottle 0        Physical Exam:    Vital Signs:   Vitals:    07/30/19 1033   BP: 106/67   Pulse: (!) 124   Resp: 20   Temp: 97.9 °F (36.6 °C)       General Appearance: Well appearing in no acute distress    Labs:  Lab Results   Component Value Date    WBC 8.57 07/23/2019    HGB 13.2 (L) 07/23/2019    HCT 37.8 (L) 07/23/2019     07/23/2019    CHOL 200 (H) 07/23/2019    TRIG 70 07/23/2019    HDL 50 07/23/2019    ALT 91 (H) 07/23/2019     (H) 07/23/2019     (L) 07/23/2019    K 4.8 07/23/2019    CL 88 (L) 07/23/2019    CREATININE 0.8 07/23/2019    CREATININE 0.8 07/23/2019    BUN 9 07/23/2019    CO2 25 07/23/2019    TSH 1.310 07/23/2019    PSA 1.9 07/23/2019    HGBA1C 4.9 07/23/2019       I have explained the risks and benefits of this endoscopic procedure to the patient including but not limited to bleeding, inflammation, infection, perforation, and death.      Von Brennan MD

## 2019-07-30 NOTE — ANESTHESIA PREPROCEDURE EVALUATION
07/30/2019  Pre-operative evaluation for Procedure(s) (LRB):  EGD (ESOPHAGOGASTRODUODENOSCOPY) (N/A)  COLONOSCOPY (N/A)    Louis Nickerson is a 73 y.o. male     Patient Active Problem List   Diagnosis    Prehypertension    Tobacco use disorder    COPD (chronic obstructive pulmonary disease)    Hyperlipidemia    Marijuana smoker, episodic    Alcohol use disorder, mild, abuse    Dysphagia       Review of patient's allergies indicates:  No Known Allergies    No current facility-administered medications on file prior to encounter.      Current Outpatient Medications on File Prior to Encounter   Medication Sig Dispense Refill    aspirin 81 MG Chew Take 81 mg by mouth once daily.      co-enzyme Q-10 30 mg capsule Take 30 mg by mouth 3 (three) times daily.      promethazine (PHENERGAN) 6.25 mg/5 mL syrup Take 10-20 mLs (12.5-25 mg total) by mouth every 4 (four) hours as needed for Nausea. 240 mL 0    WIXELA INHUB 250-50 mcg/dose diskus inhaler INHALE ONE PUFF INTO LUNGS TWICE DAILY 180 each 1    albuterol (VENTOLIN HFA) 90 mcg/actuation inhaler Inhale 1-2 puffs into the lungs every 6 (six) hours as needed for Wheezing. Rescue 18 g 1    ketoconazole (NIZORAL) 2 % cream Apply topically once daily. 60 g 0    multivitamin capsule Take 1 capsule by mouth once daily.      ondansetron (ZOFRAN-ODT) 4 MG TbDL Take 1-2 tablets (4-8 mg total) by mouth every 6 (six) hours as needed. 30 tablet 0       Past Surgical History:   Procedure Laterality Date    COLON SURGERY      polpy removal    DENTAL SURGERY  2009    NOSE SURGERY  1996    cancer on tip of nose was removed    TONSILLECTOMY         Social History     Socioeconomic History    Marital status:      Spouse name: Not on file    Number of children: Not on file    Years of education: Not on file    Highest education level: Not on file    Occupational History    Occupation: Retired.  Does frequent volunteer work.   Social Needs    Financial resource strain: Not on file    Food insecurity:     Worry: Not on file     Inability: Not on file    Transportation needs:     Medical: Not on file     Non-medical: Not on file   Tobacco Use    Smoking status: Current Every Day Smoker     Packs/day: 1.50     Years: 56.00     Pack years: 84.00     Types: Cigarettes     Start date: 1960    Smokeless tobacco: Never Used   Substance and Sexual Activity    Alcohol use: Yes     Comment: 2 shot Scotch on ice per day    Drug use: Not Currently     Types: Marijuana    Sexual activity: Not Currently   Lifestyle    Physical activity:     Days per week: Not on file     Minutes per session: Not on file    Stress: Not on file   Relationships    Social connections:     Talks on phone: Not on file     Gets together: Not on file     Attends Orthodox service: Not on file     Active member of club or organization: Not on file     Attends meetings of clubs or organizations: Not on file     Relationship status: Not on file   Other Topics Concern    Not on file   Social History Narrative    Lives w/wife.             Anesthesia Evaluation    I have reviewed the Patient Summary Reports.    I have reviewed the Nursing Notes.   I have reviewed the Medications.     Review of Systems  Anesthesia Hx:  No problems with previous Anesthesia    Cardiovascular:   tachycardia   Pulmonary:   COPD, moderate    Neurological:  Neurology Normal    Endocrine:  Endocrine Normal        Physical Exam   Airway/Jaw/Neck:  Airway Findings: Mouth Opening: Normal Tongue: Normal  General Airway Assessment: Adult  Mallampati: II  TM Distance: Normal, at least 6 cm       Chest/Lungs:  Chest/Lungs Findings: Expiratory Wheezes, Mod.     Heart/Vascular:  Heart Findings: Rate: Tachycardia  Rhythm: Regular Rhythm     Abdomen:  Abdomen Findings:  Ascites            Anesthesia Plan  Type of Anesthesia,  risks & benefits discussed:  Anesthesia Type:  general  Patient's Preference:   Intra-op Monitoring Plan: standard ASA monitors  Intra-op Monitoring Plan Comments:   Post Op Pain Control Plan: multimodal analgesia and IV/PO Opioids PRN  Post Op Pain Control Plan Comments:   Induction:   IV  Beta Blocker:         Informed Consent: Patient understands risks and agrees with Anesthesia plan.  Questions answered. Anesthesia consent signed with patient.  ASA Score: 3     Day of Surgery Review of History & Physical:        Anesthesia Plan Notes: GETA for aspiration risk due to tense abdomen, likely ascites        Ready For Surgery From Anesthesia Perspective.

## 2019-07-31 NOTE — ANESTHESIA POSTPROCEDURE EVALUATION
Anesthesia Post Evaluation    Patient: Louis Nickerson    Procedure(s) Performed: Procedure(s) (LRB):  EGD (ESOPHAGOGASTRODUODENOSCOPY) (N/A)  COLONOSCOPY (N/A)    Final Anesthesia Type: general  Patient location during evaluation: PACU  Patient participation: Yes- Able to Participate  Level of consciousness: awake and alert  Post-procedure vital signs: reviewed and stable  Pain management: adequate  Airway patency: patent  PONV status at discharge: No PONV  Anesthetic complications: no      Cardiovascular status: blood pressure returned to baseline  Respiratory status: unassisted  Hydration status: euvolemic  Follow-up not needed.          Vitals Value Taken Time   /55 7/30/2019  2:27 PM   Temp 36.6 °C (97.9 °F) 7/30/2019  2:15 PM   Pulse 65 7/30/2019  2:44 PM   Resp 12 7/30/2019  2:44 PM   SpO2 98 % 7/30/2019  2:44 PM   Vitals shown include unvalidated device data.      No case tracking events are documented in the log.      Pain/Henok Score: Henok Score: 10 (7/30/2019 12:32 PM)

## 2019-07-31 NOTE — PROGRESS NOTES
Attempted to contact pt concerning CT findings. 1st attempt around 4:14. Will try again tomorrow.

## 2019-08-01 NOTE — TELEPHONE ENCOUNTER
Please check with Jarrod Boyle to see if liver bx can be arranged at [facility], [place], operated by [facility] for CA diagnosis. thanks

## 2019-08-01 NOTE — TELEPHONE ENCOUNTER
----- Message from Kina Puentes MD sent at 8/1/2019  9:45 AM CDT -----  Alexander Friend,  This is Jesenia Puentes, I am covering for Alyson this week. Unfortunately, for this gentleman, it looks like this mass is not resectable- given mediastinal involvement and liver lesions. I will defer to her, however, the fastest dx is likely to biopsy the liver lesions.   Sincerely,  Jesenia Puentes    ----- Message -----  From: Jameson Clifford MD  Sent: 7/31/2019   6:44 PM  To: Alyson Herrmann MD    Never to say hello, how are you? How have you been? Only when I need something right? I found this gentleman's primary and looks like it is coming from his lung and I was looking to get some help with a bronch sooner then later (if you feel accessible and best option of lesion to biopsy). For all intensive purposes fairly healthy gentleman. Thanks for the help as always. Bad timing in sense I am going out of town starting this Friday but will be checking in and covering colleagues likely to help. Thanks again and as always. Hope all is well.     Respectfully,  Phuc Clifford

## 2019-08-01 NOTE — TELEPHONE ENCOUNTER
"----- Message from Lucrecia White sent at 8/1/2019  8:29 AM CDT -----  Contact: Iliana Nickerson (Spouse)  /  # [phone]  Name of Who is Calling: Iliana Nickerson (Spouse)      What is the request in detail:   Patient's spouse call stating, "she was returning your call and would like you to please call again.  Please do so at your earliest convenience.     THANKS!      Can the clinic reply by [facility]: no      Number to Call Back: Iliana Nickerson (Spouse) / # [phone]                                      "

## 2019-08-05 NOTE — TELEPHONE ENCOUNTER
----- Message from Megan Conde sent at 8/5/2019  8:41 AM CDT -----  Contact: Wife   Name of Who is Calling: Wife     What is the request in detail:The pt wife is requesting a call back regarding the medication and she states she has a question..............  Please contact to further discuss and advise      Can the clinic reply by [facility]: No     [facility]  [phone]

## 2019-08-05 NOTE — TELEPHONE ENCOUNTER
Make sure he is taking 2 pills daily.  He can take up to 10 days for significant reduction in edema.

## 2019-08-07 NOTE — PATIENT INSTRUCTIONS
Ray,     We are always striving for excellence. Should you receive a patient experience survey electronically or by mail, we would appreciate if you would take a few moments to give us your feedback. These surveys let us know our strengths as well as areas of opportunity for improvement to better serve you.    Thank you for your time,  Tanvi Howard MA

## 2019-08-07 NOTE — PROGRESS NOTES
"Subjective:       Patient ID: Louis Nickerson is a 73 y.o. male patient of Dr. Clifford..    Chief Complaint: Generalized Body Aches (Swollen up with fluid to the point patient ribs started hurting. )    HPI     The patient presents today with complaints of generalized edema, from his feet to his chest.  According to the medical record, the patient presented to his primary care physician late last month for his annual, but with additional complaints of edema and abdominal distention.  Examination confirm this, with an epigastric mass noted on palpation of the abdomen.  Laboratory investigation revealed elevated transaminase levels.  CT of the abdomen revealed multiple hepatic lesions consistent with metastasis.  EGD revealed significant esophagitis, which has been controlled with PPI.  Colonoscopy showed a 4 mm polyp in the ascending colon.  Recent chest CT showed a large central mass, as well as a right hilar mass, consistent with a lung malignancy.      He was prescribed furosemide, but this is not improved his edema, which he states is "worse."    Patient Active Problem List   Diagnosis    Prehypertension    Tobacco use disorder    COPD (chronic obstructive pulmonary disease)    Hyperlipidemia    Marijuana smoker, episodic    Alcohol use disorder, mild, abuse    Dysphagia       Current Outpatient Medications:     albuterol (VENTOLIN HFA) 90 mcg/actuation inhaler, Inhale 1-2 puffs into the lungs every 6 (six) hours as needed for Wheezing. Rescue, Disp: 18 g, Rfl: 1    co-enzyme Q-10 30 mg capsule, Take 30 mg by mouth 3 (three) times daily., Disp: , Rfl:     furosemide (LASIX) 20 MG tablet, Take 1-2 tablets (20-40 mg total) by mouth 2 (two) times daily., Disp: 120 tablet, Rfl: 1    multivitamin capsule, Take 1 capsule by mouth once daily., Disp: , Rfl:     ondansetron (ZOFRAN-ODT) 4 MG TbDL, Take 1-2 tablets (4-8 mg total) by mouth every 6 (six) hours as needed., Disp: 30 tablet, Rfl: 0    " pantoprazole (PROTONIX) 40 MG tablet, Take 1 tablet (40 mg total) by mouth once daily., Disp: 90 tablet, Rfl: 3    promethazine (PHENERGAN) 6.25 mg/5 mL syrup, Take 10-20 mLs (12.5-25 mg total) by mouth every 4 (four) hours as needed for Nausea., Disp: 240 mL, Rfl: 0    WIXELA INHUB 250-50 mcg/dose diskus inhaler, INHALE ONE PUFF INTO LUNGS TWICE DAILY, Disp: 180 each, Rfl: 1    albuterol (PROVENTIL) 2.5 mg /3 mL (0.083 %) nebulizer solution, Take 3 mLs (2.5 mg total) by nebulization every 6 (six) hours as needed for Wheezing. Rescue, Disp: 75 mL, Rfl: 1    aspirin 81 MG Chew, Take 81 mg by mouth once daily., Disp: , Rfl:     ketoconazole (NIZORAL) 2 % cream, Apply topically once daily., Disp: 60 g, Rfl: 0    spironolactone (ALDACTONE) 25 MG tablet, Take 1 tablet (25 mg total) by mouth 2 (two) times daily., Disp: 60 tablet, Rfl: 0    The following portions of the patient's history were reviewed and updated as appropriate: allergies, past family history, past medical history, past social history and past surgical history.    Review of Systems   Constitutional: Positive for fatigue and unexpected weight change. Negative for chills, diaphoresis and fever.   HENT: Negative for ear discharge, ear pain, hearing loss, tinnitus and voice change.    Eyes: Negative for pain.   Respiratory: Positive for cough and shortness of breath. Negative for wheezing.    Cardiovascular: Positive for leg swelling. Negative for chest pain and palpitations.   Gastrointestinal: Positive for abdominal distention. Negative for abdominal pain, blood in stool, constipation, diarrhea, nausea and vomiting.   Genitourinary: Positive for frequency (on loop diuretic), penile swelling and scrotal swelling. Negative for decreased urine volume, difficulty urinating, dysuria, enuresis, hematuria, testicular pain and urgency.   Musculoskeletal: Positive for arthralgias and myalgias. Negative for back pain.   Skin: Negative for rash.   Neurological:  "Negative for dizziness, weakness, light-headedness and headaches.   Hematological: Does not bruise/bleed easily.   Psychiatric/Behavioral: Negative for dysphoric mood and sleep disturbance. The patient is not nervous/anxious.          Objective:      BP (!) 102/54 (BP Location: Right arm, Patient Position: Sitting, BP Method: Large (Manual))   Pulse (!) 118   Ht 5' 9" (1.753 m)   Wt 79.4 kg (175 lb)   SpO2 95%   BMI 25.84 kg/m²     Physical Exam   Constitutional: He is oriented to person, place, and time. He appears well-developed and well-nourished.   HENT:   Temporal muscle wasting noted   Eyes: Conjunctivae are normal.   Neck: Neck supple. JVD present.   Cardiovascular: Normal rate, regular rhythm and normal heart sounds. Exam reveals no gallop.   No murmur heard.  Pulmonary/Chest: No accessory muscle usage. No apnea. He has decreased breath sounds in the right upper field, the right middle field and the right lower field. He has no wheezes. He has rhonchi in the left upper field, the left middle field and the left lower field. He has no rales.   Muscle wasting noted with prominent ribs   Abdominal: Soft. Bowel sounds are normal. He exhibits distension. He exhibits no mass. There is tenderness. There is no guarding.   Musculoskeletal: He exhibits edema (2+ pitting Hilario edema noted from the feet and ankles to the waist.).   Neurological: He is alert and oriented to person, place, and time.   Skin: Skin is warm and dry.   Psychiatric: He has a normal mood and affect.   Vitals reviewed.        Assessment:       1. Generalized edema    2. Other ascites    3. Other emphysema    4. Lung mass    5. Liver metastases        Plan:       Continue furosemide.  Add spironolactone.  Nebulizer ordered, with albuterol solution.  Patient is scheduled for ultrasound-guided liver biopsy next week.  Will also discuss w/ Pulmonology for recommendations on further evaluation of lung lesions.  Further recommendations to " "follow.        "This note will not be shared with the patient."  "

## 2019-08-07 NOTE — TELEPHONE ENCOUNTER
Spoke to Mr. Nickerson and patient states that he is having body pain and swelling.  Confirmed appt date and time with Dr. Tello.  Patient states understanding with no further questons.

## 2019-08-11 PROBLEM — C78.7 METASTASIS TO LIVER: Status: ACTIVE | Noted: 2019-01-01

## 2019-08-11 PROBLEM — R53.1 WEAKNESS: Status: ACTIVE | Noted: 2019-01-01

## 2019-08-11 PROBLEM — A41.9 SEPSIS: Status: ACTIVE | Noted: 2019-01-01

## 2019-08-11 PROBLEM — N17.9 AKI (ACUTE KIDNEY INJURY): Status: ACTIVE | Noted: 2019-01-01

## 2019-08-11 PROBLEM — Z71.89 GOALS OF CARE, COUNSELING/DISCUSSION: Status: ACTIVE | Noted: 2019-01-01

## 2019-08-11 PROBLEM — C34.90 LUNG CANCER: Status: ACTIVE | Noted: 2019-01-01

## 2019-08-11 NOTE — SUBJECTIVE & OBJECTIVE
Past Medical History:   Diagnosis Date    Anxiety     Basal cell carcinoma     Cancer 1987    skin CA on nose    Colon polyps 1/20/2015    Noted on colonoscopy report from 1/19/2015 from [facility].  Polyps removed.      COPD (chronic obstructive pulmonary disease)     Depression     Diverticulosis 1/20/2015    Rib fracture     T8 - noted on chest xray       Past Surgical History:   Procedure Laterality Date    COLON SURGERY      polpy removal    COLONOSCOPY N/A 7/30/2019    Performed by Von Brennan MD at [facility] ENDO (2ND FLR)    DENTAL SURGERY  2009    EGD (ESOPHAGOGASTRODUODENOSCOPY) N/A 7/30/2019    Performed by Von Brennan [facility] at [facility] ENDO (2ND FLR)    NOSE SURGERY  1996    cancer on tip of nose was removed    TONSILLECTOMY         Review of patient's allergies indicates:  No Known Allergies    Family History     Problem Relation (Age of Onset)    Alzheimer's disease Maternal Grandfather    Cancer Father, Paternal Grandmother, Paternal Grandfather    Dementia Sister    Hypertension Mother, Father    No Known Problems Sister, Sister    Stroke Mother        Tobacco Use    Smoking status: Current Every Day Smoker     Packs/day: 1.50     Years: 56.00     Pack years: 84.00     Types: Cigarettes     Start date: 1960    Smokeless tobacco: Never Used   Substance and Sexual Activity    Alcohol use: Yes     Comment: 2 shot Scotch on ice per day    Drug use: Not Currently     Types: Marijuana    Sexual activity: Not Currently      Review of Systems   Constitutional: Positive for activity change (Decreased), appetite change (Decreased), fatigue and unexpected weight change (Fluctuation d/t water retention). Negative for chills, diaphoresis and fever.   HENT: Negative for ear pain, hearing loss, postnasal drip, rhinorrhea, sore throat, tinnitus and trouble swallowing.    Eyes: Negative for visual disturbance.   Respiratory: Positive for cough (Nonproductive)  and shortness of breath. Negative for chest tightness, wheezing and stridor.    Cardiovascular: Positive for chest pain (Initially at home. Has improved. Worsens with palpation) and leg swelling (b/l).   Gastrointestinal: Positive for abdominal distention, abdominal pain, constipation, diarrhea, nausea and vomiting.   Endocrine: Negative for polydipsia and polyuria.   Genitourinary: Positive for decreased urine volume, difficulty urinating and frequency (decreased). Negative for dysuria and hematuria.   Musculoskeletal: Negative for arthralgias and myalgias.   Skin: Positive for color change (yellowing). Negative for rash.   Neurological: Positive for weakness (generalized). Negative for dizziness, light-headedness and headaches.   Hematological: Bruises/bleeds easily.   Psychiatric/Behavioral: Negative for confusion.     Objective:     Vital Signs (Most Recent):  Temp: 96.4 °F (35.8 °C) (08/11/19 1501)  Pulse: 90 (08/11/19 1501)  Resp: 20 (08/11/19 0941)  BP: (!) 87/38 (08/11/19 1501)  SpO2: (!) 90 % (08/11/19 1447) Vital Signs (24h Range):  Temp:  [92.3 °F (33.5 °C)-96.4 °F (35.8 °C)] 96.4 °F (35.8 °C)  Pulse:  [90-98] 90  Resp:  [20] 20  SpO2:  [70 %-100 %] 90 %  BP: ()/(38-60) 87/38   Weight: 68 kg (150 lb)  Body mass index is 22.15 kg/m².      Intake/Output Summary (Last 24 hours) at 8/11/2019 1551  Last data filed at 8/11/2019 1520  Gross per 24 hour   Intake 2400 ml   Output --   Net 2400 ml       Physical Exam   Constitutional: He is oriented to person, place, and time. He appears cachectic. He appears ill.   HENT:   Head: Normocephalic and atraumatic.   Right Ear: Hearing and external ear normal.   Left Ear: Hearing and external ear normal.   Mouth/Throat: Oropharynx is clear and moist and mucous membranes are normal.   Eyes: EOM and lids are normal. Scleral icterus is present.   Neck: Full passive range of motion without pain and phonation normal. No JVD present. No tracheal tenderness present.  Carotid bruit is not present. No tracheal deviation present. No thyroid mass and no thyromegaly present.   Cardiovascular: Normal rate, regular rhythm, S1 normal, S2 normal, normal heart sounds and normal pulses. Exam reveals no gallop, no S3, no S4 and no friction rub.   No murmur heard.  Pulses:       Radial pulses are 2+ on the right side, and 2+ on the left side.   Pulmonary/Chest: Effort normal. Stridor (R side > L) present. No respiratory distress. He has no wheezes. He has no rhonchi. He has no rales.   Abdominal: He exhibits distension. Bowel sounds are absent. There is hepatomegaly (Massive). There is generalized tenderness. There is rigidity.   Musculoskeletal: He exhibits edema (B/l LE pitting edema).   Lymphadenopathy:     He has no cervical adenopathy.   Neurological: He is alert and oriented to person, place, and time. He has normal strength. GCS eye subscore is 4. GCS verbal subscore is 5. GCS motor subscore is 6.   Skin: Skin is warm, dry and intact. No rash noted. He is not diaphoretic.   Slightly yellow.   Psychiatric: He has a normal mood and affect. His speech is normal and behavior is normal. Judgment and thought content normal. Cognition and memory are normal. He is attentive.   Nursing note and vitals reviewed.      Vents:     Lines/Drains/Airways     Drain                 Urethral Catheter 08/11/19 1118 Temperature probe less than 1 day          Peripheral Intravenous Line                 Peripheral IV - Single Lumen 08/11/19 0940 18 G Right Antecubital less than 1 day         Peripheral IV - Single Lumen 08/11/19 0945 18 G Right Wrist less than 1 day         Peripheral IV - Single Lumen 08/11/19 1115 18 G less than 1 day              Significant Labs:    CBC/Anemia Profile:  Recent Labs   Lab 08/11/19  1004   WBC 24.65*   HGB 11.3*   HCT 31.7*      MCV 93   RDW 19.2*        Chemistries:  Recent Labs   Lab 08/11/19  1004 08/11/19  1139   * 125*   K 7.0* 6.4*   CL 87* 90*   CO2  11* 14*   * 99*   CREATININE 3.6* 3.4*   CALCIUM 8.8 8.0*   ALBUMIN 2.3* 2.0*   PROT 5.2* 4.3*   BILITOT 18.7* 16.0*   ALKPHOS 679* 570*   * 288*   * 788*   MG 3.1*  --        ABGs:   Recent Labs   Lab 08/11/19  1025   PH 7.252*   PCO2 39.7   HCO3 17.5*   POCSATURATED 96   BE -10     Bilirubin:   Recent Labs   Lab 07/23/19  1559 08/11/19  1004 08/11/19  1139   BILITOT 2.4* 18.7* 16.0*     CMP:   Recent Labs   Lab 08/11/19  1004 08/11/19  1139   * 125*   K 7.0* 6.4*   CL 87* 90*   CO2 11* 14*   GLU 63* 64*   * 99*   CREATININE 3.6* 3.4*   CALCIUM 8.8 8.0*   PROT 5.2* 4.3*   ALBUMIN 2.3* 2.0*   BILITOT 18.7* 16.0*   ALKPHOS 679* 570*   * 788*   * 288*   ANIONGAP 25* 21*   EGFRNONAA 15.8* 16.9*     Coagulation:   Recent Labs   Lab 08/11/19  1004   INR 1.6*     Lactic Acid:   Recent Labs   Lab 08/11/19  1004   LACTATE 6.7*     Troponin:   Recent Labs   Lab 08/11/19  1004   TROPONINI 0.031*     Urine Studies:   Recent Labs   Lab 08/11/19  1024   COLORU [place]   APPEARANCEUA Cloudy*   PHUR 5.0   SPECGRAV 1.015   PROTEINUA 2+*   GLUCUA 1+*   KETONESU Negative   BILIRUBINUA 1+*   OCCULTUA 3+*   NITRITE Negative   LEUKOCYTESUR Trace*   RBCUA 47*   WBCUA 29*   BACTERIA Few*   SQUAMEPITHEL 1   HYALINECASTS 19*       Significant Imaging: I have reviewed and interpreted all pertinent imaging results/findings within the past 24 hours.

## 2019-08-11 NOTE — SUBJECTIVE & OBJECTIVE
Past Medical History:   Diagnosis Date    Anxiety     Basal cell carcinoma     Cancer 1987    skin CA on nose    Colon polyps 1/20/2015    Noted on colonoscopy report from 1/19/2015 from [facility].  Polyps removed.      COPD (chronic obstructive pulmonary disease)     Depression     Diverticulosis 1/20/2015    Rib fracture     T8 - noted on chest xray       Past Surgical History:   Procedure Laterality Date    COLON SURGERY      polpy removal    COLONOSCOPY N/A 7/30/2019    Performed by Von Brennan MD at [facility] ENDO (2ND FLR)    DENTAL SURGERY  2009    EGD (ESOPHAGOGASTRODUODENOSCOPY) N/A 7/30/2019    Performed by Von Brennan MD at [facility] END[facility] (2ND FLR)    NOSE SURGERY  1996    cancer on tip of nose was removed    TONSILLECTOMY         Review of patient's allergies indicates:  No Known Allergies    Family History     Problem Relation (Age of Onset)    Alzheimer's disease Maternal Grandfather    Cancer Father, Paternal Grandmother, Paternal Grandfather    Dementia Sister    Hypertension Mother, Father    No Known Problems Sister, Sister    Stroke Mother        Tobacco Use    Smoking status: Current Every Day Smoker     Packs/day: 1.50     Years: 56.00     Pack years: 84.00     Types: Cigarettes     Start date: 1960    Smokeless tobacco: Never Used   Substance and Sexual Activity    Alcohol use: Yes     Comment: 2 shot Scotch on ice per day    Drug use: Not Currently     Types: Marijuana    Sexual activity: Not Currently      Review of Systems   Constitutional: Positive for activity change (Decreased), appetite change (Decreased), fatigue and unexpected weight change (Fluctuation d/t water retention). Negative for chills, diaphoresis and fever.   HENT: Negative for ear pain, hearing loss, postnasal drip, rhinorrhea, sore throat, tinnitus and trouble swallowing.    Eyes: Negative for visual disturbance.   Respiratory: Positive for cough (Nonproductive)  and shortness of breath. Negative for chest tightness, wheezing and stridor.    Cardiovascular: Positive for chest pain (Initially at home. Has improved. Worsens with palpation) and leg swelling (b/l).   Gastrointestinal: Positive for abdominal distention, abdominal pain, constipation, diarrhea, nausea and vomiting.   Endocrine: Negative for polydipsia and polyuria.   Genitourinary: Positive for decreased urine volume, difficulty urinating and frequency (decreased). Negative for dysuria and hematuria.   Musculoskeletal: Negative for arthralgias and myalgias.   Skin: Positive for color change (yellowing). Negative for rash.   Neurological: Positive for weakness (generalized). Negative for dizziness, light-headedness and headaches.   Hematological: Bruises/bleeds easily.   Psychiatric/Behavioral: Negative for confusion.     Objective:     Vital Signs (Most Recent):  Temp: 96.4 °F (35.8 °C) (08/11/19 1610)  Pulse: (!) 0 (08/11/19 1610)  Resp: 20 (08/11/19 0941)  BP: (!) 72/37 (08/11/19 1601)  SpO2: (!) 90 % (08/11/19 1447) Vital Signs (24h Range):  Temp:  [92.3 °F (33.5 °C)-96.4 °F (35.8 °C)] 96.4 °F (35.8 °C)  Pulse:  [0-98] 0  Resp:  [20] 20  SpO2:  [70 %-100 %] 90 %  BP: ()/(37-60) 72/37   Weight: 68 kg (150 lb)  Body mass index is 22.15 kg/m².      Intake/Output Summary (Last 24 hours) at 8/11/2019 1712  Last data filed at 8/11/2019 1520  Gross per 24 hour   Intake 2400 ml   Output --   Net 2400 ml       Physical Exam   Constitutional: He is oriented to person, place, and time. He appears cachectic. He appears ill.   HENT:   Head: Normocephalic and atraumatic.   Right Ear: Hearing and external ear normal.   Left Ear: Hearing and external ear normal.   Mouth/Throat: Oropharynx is clear and moist and mucous membranes are normal.   Eyes: EOM and lids are normal. Scleral icterus is present.   Neck: Full passive range of motion without pain and phonation normal. No JVD present. No tracheal tenderness present.  Carotid bruit is not present. No tracheal deviation present. No thyroid mass and no thyromegaly present.   Cardiovascular: Normal rate, regular rhythm, S1 normal, S2 normal, normal heart sounds and normal pulses. Exam reveals no gallop, no S3, no S4 and no friction rub.   No murmur heard.  Pulses:       Radial pulses are 2+ on the right side, and 2+ on the left side.   Pulmonary/Chest: Effort normal. Stridor (R side > L) present. No respiratory distress. He has no wheezes. He has no rhonchi. He has no rales.   Abdominal: He exhibits distension. Bowel sounds are absent. There is hepatomegaly (Massive). There is generalized tenderness. There is rigidity.   Musculoskeletal: He exhibits edema (B/l LE pitting edema).   Lymphadenopathy:     He has no cervical adenopathy.   Neurological: He is alert and oriented to person, place, and time. He has normal strength. GCS eye subscore is 4. GCS verbal subscore is 5. GCS motor subscore is 6.   Skin: Skin is warm, dry and intact. No rash noted. He is not diaphoretic.   Slightly yellow.   Psychiatric: He has a normal mood and affect. His speech is normal and behavior is normal. Judgment and thought content normal. Cognition and memory are normal. He is attentive.   Nursing note and vitals reviewed.      Vents:     Lines/Drains/Airways     Drain                 Urethral Catheter 08/11/19 1118 Temperature probe less than 1 day          Peripheral Intravenous Line                 Peripheral IV - Single Lumen 08/11/19 0940 18 G Right Antecubital less than 1 day         Peripheral IV - Single Lumen 08/11/19 0945 18 G Right Wrist less than 1 day         Peripheral IV - Single Lumen 08/11/19 1115 18 G less than 1 day              Significant Labs:    CBC/Anemia Profile:  Recent Labs   Lab 08/11/19  1004   WBC 24.65*   HGB 11.3*   HCT 31.7*      MCV 93   RDW 19.2*        Chemistries:  Recent Labs   Lab 08/11/19  1004 08/11/19  1139   * 125*   K 7.0* 6.4*   CL 87* 90*   CO2  11* 14*   * 99*   CREATININE 3.6* 3.4*   CALCIUM 8.8 8.0*   ALBUMIN 2.3* 2.0*   PROT 5.2* 4.3*   BILITOT 18.7* 16.0*   ALKPHOS 679* 570*   * 288*   * 788*   MG 3.1*  --        ABGs:   Recent Labs   Lab 08/11/19  1025   PH 7.252*   PCO2 39.7   HCO3 17.5*   POCSATURATED 96   BE -10     Bilirubin:   Recent Labs   Lab 07/23/19  1559 08/11/19  1004 08/11/19  1139   BILITOT 2.4* 18.7* 16.0*     CMP:   Recent Labs   Lab 08/11/19  1004 08/11/19  1139   * 125*   K 7.0* 6.4*   CL 87* 90*   CO2 11* 14*   GLU 63* 64*   * 99*   CREATININE 3.6* 3.4*   CALCIUM 8.8 8.0*   PROT 5.2* 4.3*   ALBUMIN 2.3* 2.0*   BILITOT 18.7* 16.0*   ALKPHOS 679* 570*   * 788*   * 288*   ANIONGAP 25* 21*   EGFRNONAA 15.8* 16.9*     Coagulation:   Recent Labs   Lab 08/11/19  1004   INR 1.6*     Lactic Acid:   Recent Labs   Lab 08/11/19  1004   LACTATE 6.7*     Troponin:   Recent Labs   Lab 08/11/19  1004   TROPONINI 0.031*     Urine Studies:   Recent Labs   Lab 08/11/19  1024   COLORU [place]   APPEARANCEUA Cloudy*   PHUR 5.0   SPECGRAV 1.015   PROTEINUA 2+*   GLUCUA 1+*   KETONESU Negative   BILIRUBINUA 1+*   OCCULTUA 3+*   NITRITE Negative   LEUKOCYTESUR Trace*   RBCUA 47*   WBCUA 29*   BACTERIA Few*   SQUAMEPITHEL 1   HYALINECASTS 19*       Significant Imaging: I have reviewed and interpreted all pertinent imaging results/findings within the past 24 hours.

## 2019-08-11 NOTE — ED NOTES
Patient identifiers verified and correct for Louis Nickerson.    LOC: The patient is awake, alert and aware of environment with an appropriate affect, the patient is oriented to self and place only.  APPEARANCE: Patient resting comfortably and in no acute distress, patient is incontinent of stool.   SKIN: The skin is cool and dry, color is jaundice, patient has delayed skin turgor, dry mucus membranes.  MUSCULOSKELETAL: Patient moving all extremities spontaneously, no obvious swelling or deformities noted.  RESPIRATORY: Airway is open and patent, respirations are spontaneous, increased WOB, Wheezes auscultated bilaterally.   CARDIAC: Patient has a normal rate and regular rhythm. 4+ BLE edema.   ABDOMEN: Appears distended, edematous. Normoactive bowel sounds in all quadrants.   NEUROLOGIC: PERRL, 3 mm bilaterally, eyes open spontaneously, behavior appropriate to situation, follows commands, facial expression symmetrical, bilateral hand grasp equal and even, purposeful motor response noted, normal sensation in all extremities when touched with a finger.

## 2019-08-11 NOTE — ASSESSMENT & PLAN NOTE
Per CT on 7/26/19 and 8/11/19  Biopsy scheduled for later this week  Mass pressing on SVC  Mild SOB and non-productive cough    Plan:  - GOC discussion resulting in comfort care measures

## 2019-08-11 NOTE — SIGNIFICANT EVENT
Call to bedside by nurse. Pt was examined. He was pulseless. Reflexes absent. No lung sounds. Family at bedside. Time of death: 1636    MD [email or web address]  [phone]

## 2019-08-11 NOTE — ASSESSMENT & PLAN NOTE
Liver failure per CMP and coags  VANDANA  Liver compressing abdominal organs    Plan:  - GOC discussion resulting in comfort care measures

## 2019-08-11 NOTE — ED NOTES
The patient is awake, he is aware of name Family at bedside.    Patient is uncomfortable. Airway is open and patent.  Mild tachypnea noted. Explanation of care provided to family and patient. No needs at this time. Will continue to monitor.

## 2019-08-11 NOTE — HOSPITAL COURSE
Pt was evaluated in the ED by Critical Care team. Pt was found to have irreversible multi-organ failure secondary to metastatic liver disease. End of life discussion had with pt and family at bedside. Pt and family agreed to DNR/DNI and to begin comfort measures. Pt was to be admitted to ICU for monitoring with plan to transfer to hospice on 19. Prior to transfer to ICU, pt  in the ED. MD was called to pronounce and time of death was called at 1636.

## 2019-08-11 NOTE — ASSESSMENT & PLAN NOTE
Progressive weakness over the last 3-4 weeks  Pt unable to get up after vomiting last night. Was found on bathroom floor too weak to raise himself off floor  Likely d/t metastatic disease, poor oral intake    Plan:  - GOC discussion resulting in comfort care measures

## 2019-08-11 NOTE — ED NOTES
Dr. Knutson at the bedside, sitting with family.  Patient is moaning of pain, he is coughing and uncomfortable.  Patient is moaning, he is attemping to move in the bed due to uncomfortableness.

## 2019-08-11 NOTE — ASSESSMENT & PLAN NOTE
BUN 99, Cr 3.4  Decreased UOP even on Lasix  Likely 2/2 liver failure  Multiple electrolyte abnormalities    Plan:  - GOC discussion resulting in comfort care measures

## 2019-08-11 NOTE — ED NOTES
Pt BIBA. He was found on bathroom floor by his wife. Pt reports he had been there all night after lowering himself to floor. He denies pain. Reports BLE and abdominal edema x 3 weeks. Recent Lung CA diagnosis with mets to liver. Chemo, radiation not yet initiating. Pt has also not yet seen Oncologist in clinic. He reports decreased appetite, N/V. Denies fever, chills. Wife reports worsening jaundice and confusion.

## 2019-08-11 NOTE — H&P
[facility]  Critical Care Medicine  History & Physical    Patient Name: Louis Nickerson  MRN: [id number]  Admission Date: 8/11/2019  Hospital Length of Stay: 0 days  Code Status: DNR  Attending Physician: Shadi Knutson MD   Primary Care Provider: Erik Tello Jr, MD   Principal Problem: Multi-organ failure with liver failure    Subjective:     HPI:  73M with COPD and suspected Lung Cancer (w/ SVC impingement) and suspected mets to liver per CT 7/26/19 presents to ED this am after found on the floor of his bathroom 8 hours after vomiting.  Patient reports that he got up to go to the restroom sometime after 8pm and needed to vomit. He brought himself to the floor to reach the toilet but felt weak and could not rise from the floor. He denies feeling CP or vertigo prior. Denies LOC. Denies hitting head. Patient denies cirrhosis, but reports IR liver bx to come later this week. He was also a heavy drinker and smoker but quit 3-4 wk ago. Patient was recently started on Lasix 2/2 increased fluid retention and has not responded well, stating that he has produced even less urine. He states that he has stopped drinking as much bc he was not getting urine out. Denies fevers. C-scope was neg recently. Admits to CP while down, but explains it more as a general fullness that hurts with inspiration. Patient admits to vomiting, has dried red-colored substance around mouth that he attributed to tomato soup. Labs demonstrated VANDANA, Liver failure, and possible infection.    Hospital/ICU Course:  No notes on file     Past Medical History:   Diagnosis Date    Anxiety     Basal cell carcinoma     Cancer 1987    skin CA on nose    Colon polyps 1/20/2015    Noted on colonoscopy report from 1/19/2015 from [facility].  Polyps removed.      COPD (chronic obstructive pulmonary disease)     Depression     Diverticulosis 1/20/2015    Rib fracture     T8 - noted on  chest xray       Past Surgical History:   Procedure Laterality Date    COLON SURGERY      polpy removal    COLONOSCOPY N/A 7/30/2019    Performed by Von Brennan [facility] at [facility] (2ND FLR)    DENTAL SURGERY  2009    EGD (ESOPHAGOGASTRODUODENOSCOPY) N/A 7/30/2019    Performed by Von Brennan MD at [facility] (2ND FLR)    NOSE SURGERY  1996    cancer on tip of nose was removed    TONSILLECTOMY         Review of patient's allergies indicates:  No Known Allergies    Family History     Problem Relation (Age of Onset)    Alzheimer's disease Maternal Grandfather    Cancer Father, Paternal Grandmother, Paternal Grandfather    Dementia Sister    Hypertension Mother, Father    No Known Problems Sister, Sister    Stroke Mother        Tobacco Use    Smoking status: Current Every Day Smoker     Packs/day: 1.50     Years: 56.00     Pack years: 84.00     Types: Cigarettes     Start date: 1960    Smokeless tobacco: Never Used   Substance and Sexual Activity    Alcohol use: Yes     Comment: 2 shot Scotch on ice per day    Drug use: Not Currently     Types: Marijuana    Sexual activity: Not Currently      Review of Systems   Constitutional: Positive for activity change (Decreased), appetite change (Decreased), fatigue and unexpected weight change (Fluctuation d/t water retention). Negative for chills, diaphoresis and fever.   HENT: Negative for ear pain, hearing loss, postnasal drip, rhinorrhea, sore throat, tinnitus and trouble swallowing.    Eyes: Negative for visual disturbance.   Respiratory: Positive for cough (Nonproductive) and shortness of breath. Negative for chest tightness, wheezing and stridor.    Cardiovascular: Positive for chest pain (Initially at home. Has improved. Worsens with palpation) and leg swelling (b/l).   Gastrointestinal: Positive for abdominal distention, abdominal pain, constipation, diarrhea, nausea and vomiting.   Endocrine: Negative for polydipsia and polyuria.   Genitourinary: Positive  for decreased urine volume, difficulty urinating and frequency (decreased). Negative for dysuria and hematuria.   Musculoskeletal: Negative for arthralgias and myalgias.   Skin: Positive for color change (yellowing). Negative for rash.   Neurological: Positive for weakness (generalized). Negative for dizziness, light-headedness and headaches.   Hematological: Bruises/bleeds easily.   Psychiatric/Behavioral: Negative for confusion.     Objective:     Vital Signs (Most Recent):  Temp: 96.4 °F (35.8 °C) (08/11/19 1501)  Pulse: 90 (08/11/19 1501)  Resp: 20 (08/11/19 0941)  BP: (!) 87/38 (08/11/19 1501)  SpO2: (!) 90 % (08/11/19 1447) Vital Signs (24h Range):  Temp:  [92.3 °F (33.5 °C)-96.4 °F (35.8 °C)] 96.4 °F (35.8 °C)  Pulse:  [90-98] 90  Resp:  [20] 20  SpO2:  [70 %-100 %] 90 %  BP: ()/(38-60) 87/38   Weight: 68 kg (150 lb)  Body mass index is 22.15 kg/m².      Intake/Output Summary (Last 24 hours) at 8/11/2019 1551  Last data filed at 8/11/2019 1520  Gross per 24 hour   Intake 2400 ml   Output --   Net 2400 ml       Physical Exam   Constitutional: He is oriented to person, place, and time. He appears cachectic. He appears ill.   HENT:   Head: Normocephalic and atraumatic.   Right Ear: Hearing and external ear normal.   Left Ear: Hearing and external ear normal.   Mouth/Throat: Oropharynx is clear and moist and mucous membranes are normal.   Eyes: EOM and lids are normal. Scleral icterus is present.   Neck: Full passive range of motion without pain and phonation normal. No JVD present. No tracheal tenderness present. Carotid bruit is not present. No tracheal deviation present. No thyroid mass and no thyromegaly present.   Cardiovascular: Normal rate, regular rhythm, S1 normal, S2 normal, normal heart sounds and normal pulses. Exam reveals no gallop, no S3, no S4 and no friction rub.   No murmur heard.  Pulses:       Radial pulses are 2+ on the right side, and 2+ on the left side.   Pulmonary/Chest: Effort  normal. Stridor (R side > L) present. No respiratory distress. He has no wheezes. He has no rhonchi. He has no rales.   Abdominal: He exhibits distension. Bowel sounds are absent. There is hepatomegaly (Massive). There is generalized tenderness. There is rigidity.   Musculoskeletal: He exhibits edema (B/l LE pitting edema).   Lymphadenopathy:     He has no cervical adenopathy.   Neurological: He is alert and oriented to person, place, and time. He has normal strength. GCS eye subscore is 4. GCS verbal subscore is 5. GCS motor subscore is 6.   Skin: Skin is warm, dry and intact. No rash noted. He is not diaphoretic.   Slightly yellow.   Psychiatric: He has a normal mood and affect. His speech is normal and behavior is normal. Judgment and thought content normal. Cognition and memory are normal. He is attentive.   Nursing note and vitals reviewed.      Vents:     Lines/Drains/Airways     Drain                 Urethral Catheter 08/11/19 1118 Temperature probe less than 1 day          Peripheral Intravenous Line                 Peripheral IV - Single Lumen 08/11/19 0940 18 G Right Antecubital less than 1 day         Peripheral IV - Single Lumen 08/11/19 0945 18 G Right Wrist less than 1 day         Peripheral IV - Single Lumen 08/11/19 1115 18 G less than 1 day              Significant Labs:    CBC/Anemia Profile:  Recent Labs   Lab 08/11/19  1004   WBC 24.65*   HGB 11.3*   HCT 31.7*      MCV 93   RDW 19.2*        Chemistries:  Recent Labs   Lab 08/11/19  1004 08/11/19  1139   * 125*   K 7.0* 6.4*   CL 87* 90*   CO2 11* 14*   * 99*   CREATININE 3.6* 3.4*   CALCIUM 8.8 8.0*   ALBUMIN 2.3* 2.0*   PROT 5.2* 4.3*   BILITOT 18.7* 16.0*   ALKPHOS 679* 570*   * 288*   * 788*   MG 3.1*  --        ABGs:   Recent Labs   Lab 08/11/19  1025   PH 7.252*   PCO2 39.7   HCO3 17.5*   POCSATURATED 96   BE -10     Bilirubin:   Recent Labs   Lab 07/23/19  1559 08/11/19  1004 08/11/19  1139   BILITOT 2.4*  18.7* 16.0*     CMP:   Recent Labs   Lab 08/11/19  1004 08/11/19  1139   * 125*   K 7.0* 6.4*   CL 87* 90*   CO2 11* 14*   GLU 63* 64*   * 99*   CREATININE 3.6* 3.4*   CALCIUM 8.8 8.0*   PROT 5.2* 4.3*   ALBUMIN 2.3* 2.0*   BILITOT 18.7* 16.0*   ALKPHOS 679* 570*   * 788*   * 288*   ANIONGAP 25* 21*   EGFRNONAA 15.8* 16.9*     Coagulation:   Recent Labs   Lab 08/11/19  1004   INR 1.6*     Lactic Acid:   Recent Labs   Lab 08/11/19  1004   LACTATE 6.7*     Troponin:   Recent Labs   Lab 08/11/19  1004   TROPONINI 0.031*     Urine Studies:   Recent Labs   Lab 08/11/19  1024   COLORU [place]   APPEARANCEUA Cloudy*   PHUR 5.0   SPECGRAV 1.015   PROTEINUA 2+*   GLUCUA 1+*   KETONESU Negative   BILIRUBINUA 1+*   OCCULTUA 3+*   NITRITE Negative   LEUKOCYTESUR Trace*   RBCUA 47*   WBCUA 29*   BACTERIA Few*   SQUAMEPITHEL 1   HYALINECASTS 19*       Significant Imaging: I have reviewed and interpreted all pertinent imaging results/findings within the past 24 hours.    Assessment/Plan:     Renal/  VANDANA (acute kidney injury)  BUN 99, Cr 3.4  Decreased UOP even on Lasix  Likely 2/2 liver failure  Multiple electrolyte abnormalities    Plan:  - GOC discussion resulting in comfort care measures    Oncology  Lung cancer  Per CT on 7/26/19 and 8/11/19  Biopsy scheduled for later this week  Mass pressing on SVC  Mild SOB and non-productive cough    Plan:  - GOC discussion resulting in comfort care measures    Metastasis to liver  Per CT on 7/26/19 and 8/11/19  Hepatomegaly with compression of abdominal organs and bladder  Source likely from Lungs    Plan:  - GOC discussion resulting in comfort care measures  - Morphine PRN  - Palliative care consulted to visit with pt on 8/12      GI  * Multi-organ failure with liver failure  Liver failure per CMP and coags  VANDANA  Liver compressing abdominal organs    Plan:  - GOC discussion resulting in comfort care measures      Other  Goals of care,  counseling/discussion  Late stage lung cancer, likely small cell.  Mets to liver. Massive hepatomegaly with multi-organ failure  GOC discussed with pt and family. Pt wishes to be DNR/DNI. Wants to go to hospice  Comfort measures    Plan:  - Consult Palliative care for hospice  - Morphine PRN    Weakness  Progressive weakness over the last 3-4 weeks  Pt unable to get up after vomiting last night. Was found on bathroom floor too weak to raise himself off floor  Likely d/t metastatic disease, poor oral intake    Plan:  - GOC discussion resulting in comfort care measures        Critical Care Daily Checklist:    A: Awake: RASS Goal/Actual Goal:    Actual:     B: Spontaneous Breathing Trial Performed?     C: SAT & SBT Coordinated?  NA                      D: Delirium: CAM-ICU     E: Early Mobility Performed? No   F: Feeding Goal:    Status:     Current Diet Order   No orders of the defined types were placed in this encounter.      AS: Analgesia/Sedation Morphine PRN. Comfort measures   T: Thromboembolic Prophylaxis None   H: HOB > 300 Yes   U: Stress Ulcer Prophylaxis (if needed) Famotidine   G: Glucose Control NA   B: Bowel Function     I: Indwelling Catheter (Lines & Ahn) Necessity Ahn   D: De-escalation of Antimicrobials/Pharmacotherapies Comfort measures    Plan for the day/ETD Admission to ICU    Code Status:  Family/Goals of Care: DNR         Critical secondary to Patient has a condition that poses threat to life and bodily function: Multi-organ failure with liver failure     Critical care was time spent personally by me on the following activities: development of treatment plan with patient or surrogate and bedside caregivers, discussions with consultants, evaluation of patient's response to treatment, examination of patient, ordering and performing treatments and interventions, ordering and review of laboratory studies, ordering and review of radiographic studies, pulse oximetry, re-evaluation of patient's  condition. This critical care time did not overlap with that of any other provider or involve time for any procedures.     Win Rodriguez MD  Critical Care Medicine  [facility]

## 2019-08-11 NOTE — ED PROVIDER NOTES
"Encounter Date: 8/11/2019       History     Chief Complaint   Patient presents with    Weakness     fell last night at 8pm coming from restroom- found on floor by wife this am. Pt deneis LOC. Hypotensive and CBG 78 per EMS.    Fall     73M with COPD and suspected Lung Cancer (w/ SVC impingement) and suspected mets to liver presents to ED this am after a NON-mechanical fall. Patient reports that he got up to go to the restroom sometime after 8pm and felt weak and fell slowly to the ground after using bathroom. He denies feeling CP or vertigo prior to fall. Denies LOC. Denies hitting head, states "I am a good chana." Patient denies cirrhosis, but reports IR liver bx this week, and is also a heavy drinker and smoker. Patient was recently started on Lasix 2/2 increased fluid retention and has not responded well, stating that he has produced even less urine. He states that he has stopped drinking as much bc he was not getting urine out. Denies fevers. C-scope was neg recently. Admits to CP while down, but explains it more as a general fullness that hurts with inspiration. Patient admits to vomiting, has dried ?blood around mouth (also ate tomato soup).             Review of patient's allergies indicates:  Not on File  Past Medical History:   Diagnosis Date    Anxiety     Basal cell carcinoma     Cancer 1987    skin CA on nose    Colon polyps 1/20/2015    Noted on colonoscopy report from 1/19/2015 from [facility].  Polyps removed.      COPD (chronic obstructive pulmonary disease)     Depression     Diverticulosis 1/20/2015    Rib fracture     T8 - noted on chest xray     Past Surgical History:   Procedure Laterality Date    COLON SURGERY      polpy removal    COLONOSCOPY N/A 7/30/2019    Performed by Von Brennan MD at [facility] (2ND FLR)    DENTAL SURGERY  2009    EGD (ESOPHAGOGASTRODUODENOSCOPY) N/A 7/30/2019    Performed by Von Brennan MD at [facility] END[facility] (2ND " FLR)    NOSE SURGERY  1996    cancer on tip of nose was removed    TONSILLECTOMY       Family History   Problem Relation Age of Onset    Hypertension Mother     Stroke Mother     Hypertension Father     Cancer Father         skin cancer that metastasized to his brain    Cancer Paternal Grandmother     Cancer Paternal Grandfather     Alzheimer's disease Maternal Grandfather     Dementia Sister     No Known Problems Sister     No Known Problems Sister      Social History     Tobacco Use    Smoking status: Current Every Day Smoker     Packs/day: 1.50     Years: 56.00     Pack years: 84.00     Types: Cigarettes     Start date: 1960    Smokeless tobacco: Never Used   Substance Use Topics    Alcohol use: Yes     Comment: 2 shot Scotch on ice per day    Drug use: Not Currently     Types: Marijuana     Review of Systems   Constitutional: Positive for appetite change and fatigue. Negative for fever.   HENT: Negative for sinus pressure, sneezing, sore throat and voice change.    Eyes: Negative for photophobia and visual disturbance.   Respiratory: Positive for chest tightness. Negative for shortness of breath.    Cardiovascular: Positive for leg swelling. Negative for chest pain.   Gastrointestinal: Negative for nausea.   Endocrine: Negative for polyphagia and polyuria.   Genitourinary: Positive for decreased urine volume. Negative for dysuria.        Decreased urine output   Musculoskeletal: Negative for back pain, neck pain and neck stiffness.   Skin: Negative for pallor and rash.   Neurological: Positive for weakness.   Hematological: Does not bruise/bleed easily.   Psychiatric/Behavioral: Negative for behavioral problems and confusion.       Physical Exam     Initial Vitals   BP Pulse Resp Temp SpO2   08/11/19 0941 08/11/19 0941 08/11/19 0941 08/11/19 1019 08/11/19 0941   (!) 113/54 98 20 (!) 92.3 °F (33.5 °C) 100 %      MAP       --                Physical Exam    Nursing note and vitals  reviewed.  Constitutional: He appears well-developed and well-nourished.   HENT:   Head: Normocephalic and atraumatic.   Mouth/Throat: No oropharyngeal exudate.   Eyes: Pupils are equal, round, and reactive to light. Scleral icterus is present.   Neck: Normal range of motion. No tracheal deviation present. No JVD present.   Cardiovascular: Normal rate and regular rhythm.   No murmur heard.  Pulmonary/Chest: No stridor. He is in respiratory distress. He has wheezes. He has rales.   Abdominal: Bowel sounds are normal. He exhibits distension. There is no tenderness. There is no guarding.   Taut abdomen, cirrhotic in appearance   Genitourinary: Rectal exam shows guaiac positive stool. Guaiac positive stool. : Acceptable.No discharge found.   Genitourinary Comments: Stool brown. +Guaiac   Musculoskeletal: Normal range of motion. He exhibits edema (+4 LL edema). He exhibits no tenderness.   Neurological: He is alert and oriented to person, place, and time. No cranial nerve deficit. GCS score is 15. GCS eye subscore is 4. GCS verbal subscore is 5. GCS motor subscore is 6.   Skin: Skin is warm and dry. Capillary refill takes less than 2 seconds.   Mildly jaundiced   Psychiatric: He has a normal mood and affect. Thought content normal.         ED Course   Critical Care  Date/Time: 8/14/2019 10:25 AM  Performed by: Larry Sahni DO  Authorized by: Larry Sahni DO   Direct patient critical care time: 20 minutes  Additional history critical care time: 15 minutes  Ordering / reviewing critical care time: 15 minutes  Documentation critical care time: 5 minutes  Consulting other physicians critical care time: 15 minutes  Consult with family critical care time: 10 minutes  Total critical care time (exclusive of procedural time) : 80 minutes  Critical care was necessary to treat or prevent imminent or life-threatening deterioration of the following conditions: sepsis, shock, respiratory failure, metabolic  crisis and renal failure.  Critical care was time spent personally by me on the following activities: blood draw for specimens, discussions with consultants, evaluation of patient's response to treatment, examination of patient, obtaining history from patient or surrogate, ordering and performing treatments and interventions, ordering and review of laboratory studies, ordering and review of radiographic studies, pulse oximetry, re-evaluation of patient's condition and review of old charts.        Labs Reviewed   CBC W/ AUTO DIFFERENTIAL - Abnormal; Notable for the following components:       Result Value    WBC 24.65 (*)     RBC 3.40 (*)     Hemoglobin 11.3 (*)     Hematocrit 31.7 (*)     Mean Corpuscular Hemoglobin 33.2 (*)     RDW 19.2 (*)     Immature Granulocytes 4.5 (*)     Gran # (ANC) 21.5 (*)     Immature Grans (Abs) 1.10 (*)     Gran% 87.2 (*)     Lymph% 3.9 (*)     Platelet Estimate Clumped (*)     All other components within normal limits    Narrative:     ADD ON MG & LIPASE PER DR. FANNY COLLINS AT  08/11/2019  10:37    LACTIC ACID, PLASMA - Abnormal; Notable for the following components:    Lactate (Lactic Acid) 6.7 (*)     All other components within normal limits    Narrative:     ADD ON MG & LIPASE PER DR. FANNY COLLINS AT  08/11/2019  10:37    PROTIME-INR - Abnormal; Notable for the following components:    Prothrombin Time 15.4 (*)     INR 1.6 (*)     All other components within normal limits   TROPONIN I - Abnormal; Notable for the following components:    Troponin I 0.031 (*)     All other components within normal limits    Narrative:     ADD ON MG & LIPASE PER DR. FANNY COLLINS AT  08/11/2019  10:37    PROCALCITONIN - Abnormal; Notable for the following components:    Procalcitonin 3.69 (*)     All other components within normal limits    Narrative:     ADD ON MG & LIPASE PER DR. FANNY COLLINS AT  08/11/2019  10:37    URINALYSIS, REFLEX TO URINE CULTURE - Abnormal; Notable for the following components:     Appearance, UA Cloudy (*)     Protein, UA 2+ (*)     Glucose, UA 1+ (*)     Bilirubin (UA) 1+ (*)     Occult Blood UA 3+ (*)     Leukocytes, UA Trace (*)     All other components within normal limits    Narrative:     Preferred Collection Type->Urine, Clean Catch   MAGNESIUM - Abnormal; Notable for the following components:    Magnesium 3.1 (*)     All other components within normal limits    Narrative:     ADD ON MG & LIPASE PER DR. FANNY COLLINS AT  08/11/2019  10:37    LIPASE - Abnormal; Notable for the following components:    Lipase 71 (*)     All other components within normal limits    Narrative:     ADD ON MG & LIPASE PER DR. FANNY COLLINS AT  08/11/2019  10:37    URINALYSIS MICROSCOPIC - Abnormal; Notable for the following components:    RBC, UA 47 (*)     WBC, UA 29 (*)     Bacteria Few (*)     Hyaline Casts, UA 19 (*)     All other components within normal limits    Narrative:     Preferred Collection Type->Urine, Clean Catch   AMMONIA - Abnormal; Notable for the following components:    Ammonia 108 (*)     All other components within normal limits   CK - Abnormal; Notable for the following components:    CPK 1190 (*)     All other components within normal limits    Narrative:     ADD ON MG & LIPASE PER DR. FANNY COLLINS AT  08/11/2019  10:37   ADD ON CPK & TSH PER DR. FANNY COLLINS AT  08/11/2019 11:25    COMPREHENSIVE METABOLIC PANEL - Abnormal; Notable for the following components:    Sodium 125 (*)     Potassium 6.4 (*)     Chloride 90 (*)     CO2 14 (*)     Glucose 64 (*)     BUN, Bld 99 (*)     Creatinine 3.4 (*)     Calcium 8.0 (*)     Total Protein 4.3 (*)     Albumin 2.0 (*)     Total Bilirubin 16.0 (*)     Alkaline Phosphatase 570 (*)      (*)      (*)     Anion Gap 21 (*)     eGFR if  19.6 (*)     eGFR if non  16.9 (*)     All other components within normal limits   COMPREHENSIVE METABOLIC PANEL - Abnormal; Notable for the following components:    Sodium  123 (*)     Potassium 7.0 (*)     Chloride 87 (*)     CO2 11 (*)     Glucose 63 (*)     BUN, Bld 103 (*)     Creatinine 3.6 (*)     Total Protein 5.2 (*)     Albumin 2.3 (*)     Total Bilirubin 18.7 (*)     Alkaline Phosphatase 679 (*)      (*)      (*)     Anion Gap 25 (*)     eGFR if  18.3 (*)     eGFR if non  15.8 (*)     All other components within normal limits    Narrative:     ADD ON MG & LIPASE PER DR. FANNY COLLINS AT  08/11/2019  10:37   ADD ON CPK & TSH PER DR. FANNY COLLINS AT  08/11/2019 11:25   add on CMP #[id number] per Dr. Sahni @  08/11/2019  12:14    ISTAT PROCEDURE - Abnormal; Notable for the following components:    POC PH 7.252 (*)     POC HCO3 17.5 (*)     POC TCO2 19 (*)     All other components within normal limits   ISTAT CREATININE - Abnormal; Notable for the following components:    POC Creatinine 3.9 (*)     All other components within normal limits   CULTURE, URINE    Narrative:     Preferred Collection Type->Urine, Clean Catch   CULTURE, BLOOD   CULTURE, BLOOD   B-TYPE NATRIURETIC PEPTIDE    Narrative:     ADD ON MG & LIPASE PER DR. FANNY COLLINS AT  08/11/2019  10:37    MAGNESIUM   LIPASE   CK   TSH   COMPREHENSIVE METABOLIC PANEL   TSH    Narrative:     ADD ON MG & LIPASE PER DR. FANNY COLLINS AT  08/11/2019  10:37   ADD ON CPK & TSH PER DR. FANNY COLLINS AT  08/11/2019 11:25    COMPREHENSIVE METABOLIC PANEL   TYPE & SCREEN   POCT GLUCOSE   POCT GLUCOSE     No results found for this or any previous visit (from the past 24 hour(s)).    EKG Readings: (Independently Interpreted)   Rhythm: Sinus Tachycardia. Ectopy: No Ectopy. Axis: Normal. Other Impression: No signs of ACS on EKG     ECG Results          EKG 12-lead (Final result)  Result time 08/12/19 14:31:43    Final result by Interface, [facility] (08/12/19 14:31:43)                 Narrative:    Test Reason : R07.9,    Vent. Rate : 098 BPM     Atrial Rate : 098 BPM     P-R Int : 154 ms           QRS Dur : 106 ms      QT Int : 354 ms       P-R-T Axes : 081 069 067 degrees     QTc Int : 451 ms    Normal sinus rhythm  Low voltage QRS in the limb leads  Otherwise normal ECG  When compared with ECG of 03-DEC-2011 07:09,  No significant change was found  Confirmed by Anen Hawkins MD ([id number]) on 8/12/2019 2:31:37 PM    Referred By: [email or web address]   SELF           Confirmed By:Anne Hawkins MD                            Imaging Results          CT Head Without Contrast (Final result)  Result time 08/11/19 13:10:46    Final result by Mele Farias MD (08/11/19 13:10:46)                 Impression:      1. No acute intracranial abnormalities noting sequela of chronic microvascular ischemic change and senescent change.  2. Sinus disease.      Electronically signed by: Mele Farias MD  Date:    08/11/2019  Time:    13:10             Narrative:    EXAMINATION:  CT HEAD WITHOUT CONTRAST    CLINICAL HISTORY:  found down;    TECHNIQUE:  Low dose axial images were obtained through the head.  Coronal and sagittal reformations were also performed. Contrast was not administered.    COMPARISON:  None.    FINDINGS:  There is generalized cerebral volume loss.  There is hypoattenuation in a periventricular fashion, likely sequela of chronic microvascular ischemic change.  There is no evidence of acute major vascular territory infarct, hemorrhage, or mass.  There is no hydrocephalus.  There are no abnormal extra-axial fluid collections.  There is fluid layering within the left sphenoid sinus, otherwise the visualized paranasal sinuses and mastoid air cells are clear, and there is no evidence of calvarial fracture.  The visualized soft tissues are unremarkable.                                CT Chest Abdoment Pelvis Without Contrast (XPD) (Final result)  Result time 08/11/19 13:35:50   Procedure changed from CT Chest Without Contrast     Final result by Rome Gabriel MD (08/11/19 13:35:50)                 Impression:       Although difficult to compare given differences in contrast technique, pretracheal mediastinal node is larger.  This node effaces and narrows the SVC.    Since July 31, 2019, new tree-in-bud type centrilobular airspace and ground-glass airspace opacities in the right middle lobe, suspicious for endobronchial spread of disease or infectious/inflammatory disease.    Unchanged small volume abdominal ascites.  No focal drainable fluid collection.    Since July 31, 2019, new generalized anasarca.    Known right upper lobe perihilar mass and innumerable probable hepatic metastases are similar to the prior exams.    This report was flagged in Epic as abnormal.      Electronically signed by: Rome Gabriel MD  Date:    08/11/2019  Time:    13:35             Narrative:    EXAMINATION:  CT CHEST ABDOMEN PELVIS WITHOUT CONTRAST(XPD)    CLINICAL HISTORY:  Pulmonary embolism;PE suspected, new VANDANA;    TECHNIQUE:  Low dose axial images, sagittal and coronal reformations were obtained from the thoracic inlet to the pubic symphysis Oral contrast was not administered.    COMPARISON:  CT exams dating back to July 24, 2019.    FINDINGS:  Chest:    Generalized anasarca.    No axillary or internal mammary adenopathy.    Heart size is normal.  No pericardial effusion.    Great vessels are normal in size and contour.    Although difficult to compare given differences in technique, the right pretracheal superior mediastinal mary mass is larger, measuring 6.2 x 4.7 cm on series 3, image 26 (previously 4.4 x 4.3 cm).  This mary mass effaces and narrows the SVC.    The right upper lobe suprahilar irregular shaped pulmonary mass is unchanged measuring 6.1 x 2.9 cm on series 3, image 28.  The right hilar adenopathy is unchanged given differences in technique.    The medial and lateral segments of the right middle lobe demonstrate new tree-in-bud type centrilobular airspace opacities and ground-glass airspace opacities.    Peripheral  ground-glass opacities in the basilar segments of the right lower lobe, probably dependent atelectasis.  Bilateral centrilobular emphysema.  Left lung is otherwise unremarkable.    Unremarkable pleura.    Abdomen and pelvis:    The liver is enlarged and demonstrates innumerable probable metastases, similar to CT abdomen July 24, 2019.    The spleen, pancreas, and adrenals are unremarkable.    No bowel wall thickening or dilation.  Small volume lower abdominal ascites, similar to the prior exam.  No pneumoperitoneum or pneumatosis.    No abdominal or pelvic adenopathy.    The urinary bladder is collapsed with a Ahn catheter in place.  The kidneys and ureters are unremarkable.  Prostate is unremarkable.    Bone window images:    No suspicious bone lesion.                               X-Ray Chest 1 View (Final result)  Result time 08/11/19 11:30:15    Final result by Rome Gabriel MD (08/11/19 11:30:15)                 Impression:      Since CT July 31, 2019, no significant change or new abnormality, given differences in technique.      Electronically signed by: Rome Gabriel MD  Date:    08/11/2019  Time:    11:30             Narrative:    EXAMINATION:  XR CHEST 1 VIEW    CLINICAL HISTORY:  chest pain, hypopxia;    TECHNIQUE:  Single frontal view of the chest was performed.    COMPARISON:  CT July 31, 2019    FINDINGS:  Unchanged right hilar/perihilar mass and right paratracheal adenopathy, given differences in technique.    No new focal consolidation.  No effusion or pneumothorax.    No acute bone abnormality.                                 Medical Decision Making:   History:   I obtained history from: EMS provider.  Old Medical Records: I decided to obtain old medical records.  Old Records Summarized: records from clinic visits and records from previous admission(s).  Initial Assessment:   73M initially presented with non-mechanical fall and down for >12 hours without loss of consciousness.   Differential  Diagnosis:   Dehydration  SEPSIS: PNA vs SBP vs UTI  VANDANA  SVC syndrome 2/2 cancer  PE    ACS less likely  Stroke less likely  Independently Interpreted Test(s):   I have ordered and independently interpreted X-rays - see summary below.  Clinical Tests:   Lab Tests: Ordered and Reviewed  Radiological Study: Ordered and Reviewed  Medical Tests: Ordered and Reviewed  Patient seen at 0945  Suspected PE with cancer, hypoxia, and blood around mouth  CTA ordered and POC creatinine ordered.   0950: EKG showed no signs of ACS or arrhythmia. Investigative labs and imaging ordered.   1030: Patient's WBC elevated at 25k and temp 91F, will cover with broad spectrum abx and give 30cc/kg. Metabolic acidosis on ABG. Does not appear to be COPD exacerbation.   1100: LA 6.7 will continue fluids. CTA canceled with significant VANDANA, CT chest wo ordered.   1110: ICU consulted and called, will assess patient  Since patient is > 66yo, has VANDANA, and will likely need ICU, gave Vanc/ceft/metro instead of Vanc and zosyn.   Initially concerned for PE in cancer patient with hypoxia and red sputum. And dehydration given exam and patient history. Labs further concerning for sepsis with LA 6.7, WBC 25k, Temp 91F, and procal 3.7. Will likely need ICU care overnight.      1315 CMP add-on and draw came back at 7.0 then decreased to 6.4. No signs of hyperK on EKG. Accepted to ICU, will relay for them to address. VANDANA likely pre-renal. Liver enzymes now largely elevated as well. Likely shocked liver combined with metastatic lesions sequelae. Tbili increased to 18.7, now 16.            Attending Attestation:   Physician Attestation Statement for Resident:  As the supervising MD   Physician Attestation Statement: I have personally seen and examined this patient.   I agree with the above history. -:   As the supervising MD I agree with the above PE.    As the supervising MD I agree with the above treatment, course, plan, and disposition.            I have  reviewed and concur with the resident's history, physical, assessment, and plan.  I have personally interviewed and examined the patient at bedside.  See below addendum for my evaluation and additional findings.    Briefly,  this is a 73 y.o.male with COPD and suspected lung cancer with SVC impingement in current workup with suspected liver metastasis presenting to the ED after found down on the floor in the bathroom after vomiting. He is tachycardic, hypothermic and sickly appearing.  A broad septic workup conducted.  ECG obtained with no signs of ischemia or STEMI on my read..  Chest x-ray with no acute a call consolidation, pneumothorax or free air on my read.  Patient placed on cardiac telemetry monitoring, pulse oximetry.  There is scleral icterus, he is in acute respiratory distress with wheezing and rales with abdominal distension.  He has a taut distended abdomen that appears cirrhotic.  In rectal exam with guaiac-positive stool but no gross melena.  Bilateral lower extremity edema with skin jaundice.  Appears acutely ill.  Given concern for sepsis patient was given broad-spectrum antibiotics.  Lactic acid of 6.7 with leukocytosis of 25,000. Procalcitonin 3.7 and temperature 91° suspect sepsis with complications from metastatic lung cancer.  Discussed case with Critical Care Room Medicine team who evaluated the patient at bedside.  Patient is in acute liver failure, acute renal failure with septic shock.  After discussion with Critical Care Medicine team who evaluated the patient and perform their own independent evaluation, discussed end of life care and obtained DNR.  Patient remained in the ED under critical Care Medicine Care team with comfort measures only and end of life care.  P.r.n. morphine and p.r.n. Ativan were given for abdominal pain and anxiety, and Mr. Nickerson passed away in the emergency department with his wife at his bedside.  In please see critical care note above for critical care time  which is independent of any critical care provided by any other provider.  Appreciate critical Care Medicine teams involvement, support and end of life care.  It was an honor to take care of patient and his family and provide comfort with the assistance of Critical Care Medicine team.    Complexity:  Critical      Larry Sahni DO    Dept of Emergency Medicine   [facility]  Spectralink: [id number]               Clinical Impression:       ICD-10-CM ICD-9-CM   1. Fall, initial encounter W19.XXXA E888.9   2. Chest pain R07.9 786.50   3. Physical deconditioning R53.81 799.3   4. FTT (failure to thrive) in adult R62.7 783.7   5. Metabolic acidosis E87.2 276.2   6. Observation for suspected cancer Z03.89 V71.1   7. VANDANA (acute kidney injury) N17.9 584.9   8. Sepsis, due to unspecified organism A41.9 038.9     995.91   9. Elevated lactic acid level R79.89 276.2   10. Elevated procalcitonin R79.89 790.99   11. Elevated INR R79.1 790.92   12. Normocytic anemia D64.9 285.9   13. Sepsis A41.9 038.9     995.91         Disposition:   Disposition: Admitted  Condition: Critical                Erwin Mccloud MD  Resident  08/11/19 1150       Erwin Mccloud MD  Resident  08/11/19 1324       Larry Sahni DO  08/14/19 1034

## 2019-08-11 NOTE — ASSESSMENT & PLAN NOTE
Late stage lung cancer, likely small cell.  Mets to liver. Massive hepatomegaly with multi-organ failure  GOC discussed with pt and family. Pt wishes to be DNR/DNI. Wants to go to hospice  Comfort measures    Plan:  - Consult Palliative care for hospice  - Morphine PRN

## 2019-08-11 NOTE — ED NOTES
Pt apneic, no palpable pulses. Wife at bedside.  services offered. Pt's wife declined. Comfort measures provided to spouse. Pt's face cleaned, dressed in clean gown and top sheet replaced. Critical Care called and aware.

## 2019-08-11 NOTE — DISCHARGE SUMMARY
[facility]  Critical Care Medicine  Discharge Summary      Patient Name: Louis Nickerson  MRN: [id number]  Admission Date: 8/11/2019  Hospital Length of Stay: 0 days  Discharge Date and Time:  08/11/2019 5:16 PM  Attending Physician: Shadi Knutson MD   Discharging Provider: Win Rodriguez MD  Primary Care Provider: Erik Tello Jr, MD  Reason for Admission: Liver failure    HPI:   73M with COPD and suspected Lung Cancer (w/ SVC impingement) and suspected mets to liver per CT 7/26/19 presents to ED this am after found on the floor of his bathroom 8 hours after vomiting.  Patient reports that he got up to go to the restroom sometime after 8pm and needed to vomit. He brought himself to the floor to reach the toilet but felt weak and could not rise from the floor. He denies feeling CP or vertigo prior. Denies LOC. Denies hitting head. Patient denies cirrhosis, but reports IR liver bx to come later this week. He was also a heavy drinker and smoker but quit 3-4 wk ago. Patient was recently started on Lasix 2/2 increased fluid retention and has not responded well, stating that he has produced even less urine. He states that he has stopped drinking as much bc he was not getting urine out. Denies fevers. C-scope was neg recently. Admits to CP while down, but explains it more as a general fullness that hurts with inspiration. Patient admits to vomiting, has dried red-colored substance around mouth that he attributed to tomato soup. Labs demonstrated VANDANA, Liver failure, and possible infection.    * No surgery found *    Indwelling Lines/Drains at Time of Discharge:   Lines/Drains/Airways     Drain                 Urethral Catheter 08/11/19 1118 Temperature probe less than 1 day              Hospital Course:   Pt was evaluated in the ED by Critical Care team. Pt was found to have irreversible multi-organ failure secondary to metastatic liver disease. End of life discussion had with pt and family at  bedside. Pt and family agreed to DNR/DNI and to begin comfort measures. Pt was to be admitted to ICU for monitoring with plan to transfer to hospice on 19. Prior to transfer to ICU, pt  in the ED. MD was called to pronounce and time of death was called at 1636.    Consults (From admission, onward)        Status Ordering Provider     Inpatient consult to Critical Care Medicine  Once     Provider:  (Not yet assigned)    Completed FANNY COLLINS     Inpatient consult to Hospice  Once     Provider:  (Not yet assigned)    Acknowledged ANAID LINDQUIST     Inpatient consult to Palliative Care  Once     Provider:  (Not yet assigned)    Acknowledged JONY WHYTE        Significant Labs:  ABGs:   Recent Labs   Lab 19  1025   PH 7.252*   PCO2 39.7   HCO3 17.5*   POCSATURATED 96   BE -10     Bilirubin:   Recent Labs   Lab 19  1559 19  1004 19  1139   BILITOT 2.4* 18.7* 16.0*     CMP:   Recent Labs   Lab 19  1004 19  1139   * 125*   K 7.0* 6.4*   CL 87* 90*   CO2 11* 14*   GLU 63* 64*   * 99*   CREATININE 3.6* 3.4*   CALCIUM 8.8 8.0*   PROT 5.2* 4.3*   ALBUMIN 2.3* 2.0*   BILITOT 18.7* 16.0*   ALKPHOS 679* 570*   * 788*   * 288*   ANIONGAP 25* 21*   EGFRNONAA 15.8* 16.9*     Cardiac Markers: No results for input(s): CKMB, TROPONINT, MYOGLOBIN in the last 48 hours.  Coagulation:   Recent Labs   Lab 19  1004   INR 1.6*     Lactic Acid:   Recent Labs   Lab 19  1004   LACTATE 6.7*     Troponin:   Recent Labs   Lab 19  1004   TROPONINI 0.031*     Urine Studies:   Recent Labs   Lab 19  1024   COLORU Adeola   APPEARANCEUA Cloudy*   PHUR 5.0   SPECGRAV 1.015   PROTEINUA 2+*   GLUCUA 1+*   KETONESU Negative   BILIRUBINUA 1+*   OCCULTUA 3+*   NITRITE Negative   LEUKOCYTESUR Trace*   RBCUA 47*   WBCUA 29*   BACTERIA Few*   SQUAMEPITHEL 1   HYALINECASTS 19*       Significant Imaging:  I have reviewed and interpreted all pertinent imaging  results/findings within the past 24 hours.    Pending Diagnostic Studies:     None        Final Active Diagnoses:    Diagnosis Date Noted POA    PRINCIPAL PROBLEM:  Multi-organ failure with liver failure [K72.90] 08/11/2019 Yes    Metastasis to liver [C78.7] 08/11/2019 Yes    VANDANA (acute kidney injury) [N17.9] 08/11/2019 Yes    Weakness [R53.1] 08/11/2019 Yes    Goals of care, counseling/discussion [Z71.89] 08/11/2019 Not Applicable    Lung cancer [C34.90] 08/11/2019 Yes      Problems Resolved During this Admission:     Renal/  VANDANA (acute kidney injury)  BUN 99, Cr 3.4  Decreased UOP even on Lasix  Likely 2/2 liver failure  Multiple electrolyte abnormalities    Plan:  - GOC discussion resulting in comfort care measures    Oncology  Lung cancer  Per CT on 7/26/19 and 8/11/19  Biopsy scheduled for later this week  Mass pressing on SVC  Mild SOB and non-productive cough    Plan:  - GOC discussion resulting in comfort care measures    Metastasis to liver  Per CT on 7/26/19 and 8/11/19  Hepatomegaly with compression of abdominal organs and bladder  Source likely from Lungs    Plan:  - GOC discussion resulting in comfort care measures  - Morphine PRN  - Palliative care consulted to visit with pt on 8/12      GI  * Multi-organ failure with liver failure  Liver failure per CMP and coags  VANDANA  Liver compressing abdominal organs    Plan:  - GOC discussion resulting in comfort care measures      Other  Goals of care, counseling/discussion  Late stage lung cancer, likely small cell.  Mets to liver. Massive hepatomegaly with multi-organ failure  GOC discussed with pt and family. Pt wishes to be DNR/DNI. Wants to go to hospice  Comfort measures    Plan:  - Consult Palliative care for hospice  - Morphine PRN    Weakness  Progressive weakness over the last 3-4 weeks  Pt unable to get up after vomiting last night. Was found on bathroom floor too weak to raise himself off floor  Likely d/t metastatic disease, poor oral  intake    Plan:  - GOC discussion resulting in comfort care measures      Discharged Condition:     Disposition:       Patient Instructions:   No discharge procedures on file.  Medications:  None (patient  at medical facility)     Win Rodriguez MD  Critical Care Medicine  [facility]

## 2019-08-11 NOTE — ASSESSMENT & PLAN NOTE
Per CT on 7/26/19 and 8/11/19  Hepatomegaly with compression of abdominal organs and bladder  Source likely from Lungs    Plan:  - GOC discussion resulting in comfort care measures  - Morphine PRN  - Palliative care consulted to visit with pt on 8/12

## 2019-08-11 NOTE — HPI
73M with COPD and suspected Lung Cancer (w/ SVC impingement) and suspected mets to liver per CT 7/26/19 presents to ED this am after found on the floor of his bathroom 8 hours after vomiting.  Patient reports that he got up to go to the restroom sometime after 8pm and needed to vomit. He brought himself to the floor to reach the toilet but felt weak and could not rise from the floor. He denies feeling CP or vertigo prior. Denies LOC. Denies hitting head. Patient denies cirrhosis, but reports IR liver bx to come later this week. He was also a heavy drinker and smoker but quit 3-4 wk ago. Patient was recently started on Lasix 2/2 increased fluid retention and has not responded well, stating that he has produced even less urine. He states that he has stopped drinking as much bc he was not getting urine out. Denies fevers. C-scope was neg recently. Admits to CP while down, but explains it more as a general fullness that hurts with inspiration. Patient admits to vomiting, has dried red-colored substance around mouth that he attributed to tomato soup. Labs demonstrated VANDANA, Liver failure, and possible infection.

## 2019-08-12 ENCOUNTER — TELEPHONE (OUTPATIENT)
Dept: FAMILY MEDICINE | Facility: CLINIC | Age: 74
End: 2019-08-12

## 2019-08-12 LAB — BACTERIA UR CULT: NO GROWTH

## 2019-08-16 LAB
BACTERIA BLD CULT: NORMAL
BACTERIA BLD CULT: NORMAL

## 2019-08-16 NOTE — PHYSICIAN QUERY
"PT Name: Louis Nickerson  MR #: [id number]     Physician Query Form - Diagnosis Clarification      Brigid Lewis RN, CCDS  Desk # [phone]; nora # [phone] [email or web address]      This form is a permanent document in the medical record.     Query Date: August 16, 2019    By submitting this query, we are merely seeking further clarification of documentation.  Please utilize your independent clinical judgment when addressing the question(s) below.     The medical record contains the following:      Findings Supporting Clinical Information Location in Medical Record   Acute kidney injury with hyperkalemia.   ATN vs compartment syndrome.    Per H&P    8/11/2019 10:04 8/11/2019 11:39   BUN  103 (H) 99 (H)   Cr  3.6 (H) 3.4 (H)   gfr 15.8 (A) 16.9 (A)      8/11/2019 10:24   Color, UA Adeola   Appearance, UA Cloudy (A)   Hyaline Casts, UA 19 (A)        Lab    Ceftriaxone IVPB once 8/11   Metronidazole IVPB once 8/11  Vancymycin IVPB once 8/11    LR bolus 1000 ml once 8/11  NS 1 Liter bolus in ED x1    Albuterol-ipratropium neb every 4 hours start 8/11   MAR    Temp Low: 92.3 in ED  T: 92.3 - 96.4 on 8/11    BP: 72/37 - 113/54 on 8/11  HR: 51 - 86  RR: 20  Sats: 100% NRB Mask on 8/11 @ 0941            97 - 0=90% on 8/11 ED VS Flow Sheet    DNR & comfort measures only   Mild tachypnea noted    · recently started on Lasix 2/2 increased fluid retention and has not responded well, .  · stating that he has produced even less urine.   · He states that he has stopped drinking as much bc he was not getting urine out ED RN 8/11 2pm  ED RN 8/11 1130a      ED MD     Please clarify if the  "ATN"  diagnosis has been:    [  ] Ruled In   [  ] Ruled In, Now Resolved   [  ] Ruled Out   [  ] Other/Clarification of findings (please specify): ___AKI________     [  ]   Clinically undetermined     Please document in your progress notes daily for the duration of treatment, until resolved, and include in your discharge summary.          "

## 2019-08-16 NOTE — PHYSICIAN QUERY
PT Name: Louis Nickerson  MR #: [id number]    Physician Query Form - Emergency Medicine Diagnosis Clarification     rBigid Lewis RN, CCDS  Desk # [phone]; nora # [phone] [email or web address]    This form is a permanent document in the medical record.     Query Date: August 16, 2019    By submitting this query, we are merely seeking further clarification of documentation.  Please utilize your independent clinical judgment when addressing the question(s) below.      The Medical record contains the following:     Diagnosis Supporting Clinical Information Location in Medical Record   Septic Shock    Labs further concerning for sepsis with LA 6.7, WBC 25k, Temp 91F, and procal 3.7    Per ED MD Ceftriaxone IVPB once 8/11   Metronidazole IVPB once 8/11  Vancymycin IVPB once 8/11     LR bolus 1000 ml once 8/11  NS 1 Liter bolus in ED x1     Albuterol-ipratropium neb every 4 hours start 8/11 MAR    Temp Low: 92.3 in ED       T: 92.3 - 96.4 on 8/11     BP: 72/37 - 113/54 on 8/11  HR: 51 - 86                          RR: 20  Sats: 100% NRB Mask on 8/11 @ 0941            97 - 0=90% on 8/11 ED VS     Labs on 8/11:  WBC: 24.65;                           Plts 316  Bili: 18.7, 16.0;                        Cr: 3.6, 3.4  Lactate: 6.7;                           Procalcitonin: 3.69  Blood Culture: NGTD;            Urine Culture: ngtd Labs      Metabolic acidosis   Suspected PE with cancer, hypoxia, and blood around mouth  Does not appear to be COPD exacerbation   VANDANA; dehydration; likely pre-renal.  Liver enzymes now largely elevated as well.   Likely shocked liver combined with metastatic lesions sequelae.   broad septic workup conducted  acute respiratory distress w/wheezing & rales w/abd distension  guaiac-positive stool    skin jaundice  +4 LL edema  Given concern for sepsis patient was given broad-spectrum antibiotics.    suspect sepsis with complications from metastatic lung cancer.     --Patient is in acute liver  "failure, acute renal failure with septic shock    Will need ICU overnight.    remained in the ED under critical Care Medicine Care team with comfort measures only and end of life care.   ED MD Note    Lactic Acidosis  Acute kidney injury  Shock  hyperKalemia  Cachectic  Multi-organ failure with liver failure  Late stage lung cancer, likely small cell.  Mets to liver. Massive hepatomegaly with multi-organ failure H&P     Do you agree with the Emergency Medicine diagnosis of "septic Shock".     [   ] Yes   [   ] Yes, but it resolved prior to my assessment of the patient   [ x  ] No. This patient did not have sepsis. This patient had acute liver failure.   [   ] Other/Clarification of Findings:  ________________   [  ] Clinically Undetermined       Please document in your progress notes daily for the duration of treatment until resolved and include in your discharge summary.                       "

## 2023-05-02 NOTE — TELEPHONE ENCOUNTER
----- Message from Patricia Brush sent at 3/12/2019 10:57 AM CDT -----  Contact: Pt Spouse Iliana   Name of Who is Calling: Pt Spouse Iliana     What is the request in detail: Pt Spouse Iliana called to advise she received a letter in regards to Mr. Myers's appt with the Dermatologist for an appt back in Feb. States it requires a referral. Would it be possible to back date? Iliana states he has a follow up with Dr. Gifford office on 3/18. Please call to further discuss and advise.     Can the clinic reply by [facility]: No     What Number to Call Back if not [facility]: Pt Spouse Iliana # [phone]        
Lvm notifying pts. wifeThat referral was faxed over to Dr. Gifford today.    
Within functional limits; Onset of pharyngeal swallow within timely limits: mildly reduced laryngeal lift> No overt s/s aspiration and no coughing on po intake./Within functional limits